# Patient Record
Sex: FEMALE
[De-identification: names, ages, dates, MRNs, and addresses within clinical notes are randomized per-mention and may not be internally consistent; named-entity substitution may affect disease eponyms.]

---

## 2021-04-26 ENCOUNTER — APPOINTMENT (OUTPATIENT)
Dept: INTERNAL MEDICINE | Facility: CLINIC | Age: 66
End: 2021-04-26

## 2021-04-26 PROBLEM — Z00.00 ENCOUNTER FOR PREVENTIVE HEALTH EXAMINATION: Status: ACTIVE | Noted: 2021-04-26

## 2022-10-18 ENCOUNTER — APPOINTMENT (OUTPATIENT)
Dept: OTOLARYNGOLOGY | Facility: CLINIC | Age: 67
End: 2022-10-18

## 2022-10-18 VITALS — BODY MASS INDEX: 22.08 KG/M2 | TEMPERATURE: 96.7 F | HEIGHT: 62 IN | WEIGHT: 120 LBS

## 2022-10-18 DIAGNOSIS — Z78.9 OTHER SPECIFIED HEALTH STATUS: ICD-10-CM

## 2022-10-18 DIAGNOSIS — H92.01 OTALGIA, RIGHT EAR: ICD-10-CM

## 2022-10-18 DIAGNOSIS — Z82.49 FAMILY HISTORY OF ISCHEMIC HEART DISEASE AND OTHER DISEASES OF THE CIRCULATORY SYSTEM: ICD-10-CM

## 2022-10-18 DIAGNOSIS — M26.609 UNSPECIFIED TEMPOROMANDIBULAR JOINT DISORDER: ICD-10-CM

## 2022-10-18 DIAGNOSIS — Z85.9 PERSONAL HISTORY OF MALIGNANT NEOPLASM, UNSPECIFIED: ICD-10-CM

## 2022-10-18 DIAGNOSIS — Z72.89 OTHER PROBLEMS RELATED TO LIFESTYLE: ICD-10-CM

## 2022-10-18 DIAGNOSIS — Z80.9 FAMILY HISTORY OF MALIGNANT NEOPLASM, UNSPECIFIED: ICD-10-CM

## 2022-10-18 PROCEDURE — 99203 OFFICE O/P NEW LOW 30 MIN: CPT

## 2022-10-18 RX ORDER — NIRMATRELVIR AND RITONAVIR 300-100 MG
20 X 150 MG & KIT ORAL
Qty: 30 | Refills: 0 | Status: ACTIVE | COMMUNITY
Start: 2022-04-30

## 2022-10-18 RX ORDER — METHYLPREDNISOLONE 4 MG/1
4 TABLET ORAL
Qty: 21 | Refills: 0 | Status: ACTIVE | COMMUNITY
Start: 2022-10-12

## 2022-10-18 RX ORDER — ANASTROZOLE TABLETS 1 MG/1
1 TABLET ORAL
Qty: 30 | Refills: 0 | Status: ACTIVE | COMMUNITY
Start: 2021-11-19

## 2022-10-18 RX ORDER — FLUTICASONE PROPIONATE 50 MCG
SPRAY, SUSPENSION NASAL
Refills: 0 | Status: ACTIVE | COMMUNITY

## 2022-10-18 RX ORDER — FEXOFENADINE HYDROCHLORIDE 180 MG/1
TABLET ORAL
Refills: 0 | Status: ACTIVE | COMMUNITY

## 2022-10-18 RX ORDER — BENZONATATE 200 MG/1
200 CAPSULE ORAL
Qty: 20 | Refills: 0 | Status: ACTIVE | COMMUNITY
Start: 2022-04-30

## 2022-10-18 NOTE — ASSESSMENT
[FreeTextEntry1] : She has a 4-week history of right ear pain.  Her ear was normal on exam.  Her symptoms are consistent with TMJ dysfunction.  She was tender on palpation.  She has no throat symptoms.\par \par Plan\par -Findings and management options were discussed with the patient.\par -Good ear hygiene\par -I recommended an audiogram but she was unable to stay for 1\par - Literature regarding TMJD was given to the patient\par - Management of TMJD discussed including use of a nightguard\par - she may consider physical therapy\par -She was given the names of TMJ specialists\par -I spoke with her about performing nasal endoscopy and flexible laryngoscopy to rule out lesions.  She deferred today.\par -She should follow-up if she has persistent symptoms or any concerns

## 2022-10-18 NOTE — HISTORY OF PRESENT ILLNESS
[de-identified] : MIGUEL MALONE is a 67 year old patient who has been seen here several years ago.  She is here for right ear pain for the past 4 weeks.  She takes Advil and has tried prednisone without improvement.  She has no tinnitus, dizziness, or otorrhea.  She has seen a dentist, and endodontist, an oral surgeon.  She may have TMJ dysfunction.  She does not have a nightguard.  She has mild nasal congestion and drainage.  She uses Mucinex and Flonase as needed.\par \par No history of recurrent middle ear infections or prior otologic surgery\par She does not smoke\par She has no throat pain

## 2022-10-18 NOTE — CONSULT LETTER
[Dear  ___] : Dear  [unfilled], [Consult Letter:] : I had the pleasure of evaluating your patient, [unfilled]. [Please see my note below.] : Please see my note below. [Consult Closing:] : Thank you very much for allowing me to participate in the care of this patient.  If you have any questions, please do not hesitate to contact me. [Sincerely,] : Sincerely, [FreeTextEntry3] : Ronit Quijano MD\par

## 2025-04-05 ENCOUNTER — OUTPATIENT (OUTPATIENT)
Dept: EMERGENCY DEPT | Facility: HOSPITAL | Age: 70
LOS: 1 days | End: 2025-04-05
Payer: COMMERCIAL

## 2025-04-05 VITALS
OXYGEN SATURATION: 97 % | HEART RATE: 77 BPM | SYSTOLIC BLOOD PRESSURE: 120 MMHG | RESPIRATION RATE: 16 BRPM | DIASTOLIC BLOOD PRESSURE: 61 MMHG

## 2025-04-05 VITALS
SYSTOLIC BLOOD PRESSURE: 130 MMHG | WEIGHT: 108.91 LBS | HEART RATE: 84 BPM | OXYGEN SATURATION: 100 % | DIASTOLIC BLOOD PRESSURE: 87 MMHG | RESPIRATION RATE: 18 BRPM

## 2025-04-05 LAB
ADD ON TEST-SPECIMEN IN LAB: SIGNIFICANT CHANGE UP
ALBUMIN SERPL ELPH-MCNC: 4.4 G/DL — SIGNIFICANT CHANGE UP (ref 3.3–5)
ALP SERPL-CCNC: 55 U/L — SIGNIFICANT CHANGE UP (ref 40–120)
ALT FLD-CCNC: 10 U/L — SIGNIFICANT CHANGE UP (ref 10–45)
ANION GAP SERPL CALC-SCNC: 15 MMOL/L — SIGNIFICANT CHANGE UP (ref 5–17)
APPEARANCE UR: ABNORMAL
APTT BLD: 32.3 SEC — SIGNIFICANT CHANGE UP (ref 24.5–35.6)
AST SERPL-CCNC: 17 U/L — SIGNIFICANT CHANGE UP (ref 10–40)
BASOPHILS # BLD AUTO: 0.04 K/UL — SIGNIFICANT CHANGE UP (ref 0–0.2)
BASOPHILS NFR BLD AUTO: 0.4 % — SIGNIFICANT CHANGE UP (ref 0–2)
BILIRUB DIRECT SERPL-MCNC: 0.2 MG/DL — SIGNIFICANT CHANGE UP (ref 0–0.3)
BILIRUB INDIRECT FLD-MCNC: 0.3 MG/DL — SIGNIFICANT CHANGE UP (ref 0.2–1)
BILIRUB SERPL-MCNC: 0.5 MG/DL — SIGNIFICANT CHANGE UP (ref 0.2–1.2)
BILIRUB UR-MCNC: NEGATIVE — SIGNIFICANT CHANGE UP
BLD GP AB SCN SERPL QL: NEGATIVE — SIGNIFICANT CHANGE UP
BUN SERPL-MCNC: 12 MG/DL — SIGNIFICANT CHANGE UP (ref 7–23)
CALCIUM SERPL-MCNC: 9.5 MG/DL — SIGNIFICANT CHANGE UP (ref 8.4–10.5)
CHLORIDE SERPL-SCNC: 102 MMOL/L — SIGNIFICANT CHANGE UP (ref 96–108)
CO2 SERPL-SCNC: 24 MMOL/L — SIGNIFICANT CHANGE UP (ref 22–31)
COLOR SPEC: YELLOW — SIGNIFICANT CHANGE UP
CREAT SERPL-MCNC: 0.66 MG/DL — SIGNIFICANT CHANGE UP (ref 0.5–1.3)
DIFF PNL FLD: NEGATIVE — SIGNIFICANT CHANGE UP
EGFR: 95 ML/MIN/1.73M2 — SIGNIFICANT CHANGE UP
EGFR: 95 ML/MIN/1.73M2 — SIGNIFICANT CHANGE UP
EOSINOPHIL # BLD AUTO: 0.08 K/UL — SIGNIFICANT CHANGE UP (ref 0–0.5)
EOSINOPHIL NFR BLD AUTO: 0.8 % — SIGNIFICANT CHANGE UP (ref 0–6)
GLUCOSE SERPL-MCNC: 123 MG/DL — HIGH (ref 70–99)
GLUCOSE UR QL: NEGATIVE MG/DL — SIGNIFICANT CHANGE UP
HCT VFR BLD CALC: 39.9 % — SIGNIFICANT CHANGE UP (ref 34.5–45)
HGB BLD-MCNC: 13.5 G/DL — SIGNIFICANT CHANGE UP (ref 11.5–15.5)
IMM GRANULOCYTES NFR BLD AUTO: 0.4 % — SIGNIFICANT CHANGE UP (ref 0–0.9)
INR BLD: 0.95 — SIGNIFICANT CHANGE UP (ref 0.85–1.16)
KETONES UR-MCNC: 15 MG/DL
LEUKOCYTE ESTERASE UR-ACNC: NEGATIVE — SIGNIFICANT CHANGE UP
LIDOCAIN IGE QN: 19 U/L — SIGNIFICANT CHANGE UP (ref 7–60)
LYMPHOCYTES # BLD AUTO: 1.13 K/UL — SIGNIFICANT CHANGE UP (ref 1–3.3)
LYMPHOCYTES # BLD AUTO: 10.8 % — LOW (ref 13–44)
MCHC RBC-ENTMCNC: 31.7 PG — SIGNIFICANT CHANGE UP (ref 27–34)
MCHC RBC-ENTMCNC: 33.8 G/DL — SIGNIFICANT CHANGE UP (ref 32–36)
MCV RBC AUTO: 93.7 FL — SIGNIFICANT CHANGE UP (ref 80–100)
MONOCYTES # BLD AUTO: 0.74 K/UL — SIGNIFICANT CHANGE UP (ref 0–0.9)
MONOCYTES NFR BLD AUTO: 7.1 % — SIGNIFICANT CHANGE UP (ref 2–14)
NEUTROPHILS # BLD AUTO: 8.43 K/UL — HIGH (ref 1.8–7.4)
NEUTROPHILS NFR BLD AUTO: 80.5 % — HIGH (ref 43–77)
NITRITE UR-MCNC: NEGATIVE — SIGNIFICANT CHANGE UP
NRBC BLD AUTO-RTO: 0 /100 WBCS — SIGNIFICANT CHANGE UP (ref 0–0)
PH UR: 8.5 (ref 5–8)
PLATELET # BLD AUTO: 408 K/UL — HIGH (ref 150–400)
POTASSIUM SERPL-MCNC: 3.5 MMOL/L — SIGNIFICANT CHANGE UP (ref 3.5–5.3)
POTASSIUM SERPL-SCNC: 3.5 MMOL/L — SIGNIFICANT CHANGE UP (ref 3.5–5.3)
PROT SERPL-MCNC: 6.6 G/DL — SIGNIFICANT CHANGE UP (ref 6–8.3)
PROT UR-MCNC: SIGNIFICANT CHANGE UP MG/DL
PROTHROM AB SERPL-ACNC: 10.9 SEC — SIGNIFICANT CHANGE UP (ref 9.9–13.4)
RBC # BLD: 4.26 M/UL — SIGNIFICANT CHANGE UP (ref 3.8–5.2)
RBC # FLD: 12.7 % — SIGNIFICANT CHANGE UP (ref 10.3–14.5)
RH IG SCN BLD-IMP: NEGATIVE — SIGNIFICANT CHANGE UP
RH IG SCN BLD-IMP: NEGATIVE — SIGNIFICANT CHANGE UP
SODIUM SERPL-SCNC: 141 MMOL/L — SIGNIFICANT CHANGE UP (ref 135–145)
SP GR SPEC: 1.01 — SIGNIFICANT CHANGE UP (ref 1–1.03)
UROBILINOGEN FLD QL: 0.2 MG/DL — SIGNIFICANT CHANGE UP (ref 0.2–1)
WBC # BLD: 10.46 K/UL — SIGNIFICANT CHANGE UP (ref 3.8–10.5)
WBC # FLD AUTO: 10.46 K/UL — SIGNIFICANT CHANGE UP (ref 3.8–10.5)

## 2025-04-05 PROCEDURE — 88304 TISSUE EXAM BY PATHOLOGIST: CPT

## 2025-04-05 PROCEDURE — 71046 X-RAY EXAM CHEST 2 VIEWS: CPT

## 2025-04-05 PROCEDURE — 71046 X-RAY EXAM CHEST 2 VIEWS: CPT | Mod: 26

## 2025-04-05 PROCEDURE — 88360 TUMOR IMMUNOHISTOCHEM/MANUAL: CPT | Mod: 26

## 2025-04-05 PROCEDURE — 80076 HEPATIC FUNCTION PANEL: CPT

## 2025-04-05 PROCEDURE — 86901 BLOOD TYPING SEROLOGIC RH(D): CPT

## 2025-04-05 PROCEDURE — 99285 EMERGENCY DEPT VISIT HI MDM: CPT

## 2025-04-05 PROCEDURE — 93010 ELECTROCARDIOGRAM REPORT: CPT

## 2025-04-05 PROCEDURE — 88360 TUMOR IMMUNOHISTOCHEM/MANUAL: CPT

## 2025-04-05 PROCEDURE — 88342 IMHCHEM/IMCYTCHM 1ST ANTB: CPT | Mod: 26

## 2025-04-05 PROCEDURE — 36415 COLL VENOUS BLD VENIPUNCTURE: CPT

## 2025-04-05 PROCEDURE — 44970 LAPAROSCOPY APPENDECTOMY: CPT

## 2025-04-05 PROCEDURE — 86850 RBC ANTIBODY SCREEN: CPT

## 2025-04-05 PROCEDURE — 86900 BLOOD TYPING SEROLOGIC ABO: CPT

## 2025-04-05 PROCEDURE — 85025 COMPLETE CBC W/AUTO DIFF WBC: CPT

## 2025-04-05 PROCEDURE — 88304 TISSUE EXAM BY PATHOLOGIST: CPT | Mod: 26

## 2025-04-05 PROCEDURE — C9399: CPT

## 2025-04-05 PROCEDURE — 88342 IMHCHEM/IMCYTCHM 1ST ANTB: CPT

## 2025-04-05 PROCEDURE — 85730 THROMBOPLASTIN TIME PARTIAL: CPT

## 2025-04-05 PROCEDURE — 83690 ASSAY OF LIPASE: CPT

## 2025-04-05 PROCEDURE — 93005 ELECTROCARDIOGRAM TRACING: CPT

## 2025-04-05 PROCEDURE — 74177 CT ABD & PELVIS W/CONTRAST: CPT | Mod: 26

## 2025-04-05 PROCEDURE — 80048 BASIC METABOLIC PNL TOTAL CA: CPT

## 2025-04-05 PROCEDURE — C1889: CPT

## 2025-04-05 PROCEDURE — 74177 CT ABD & PELVIS W/CONTRAST: CPT | Mod: MC

## 2025-04-05 PROCEDURE — 85610 PROTHROMBIN TIME: CPT

## 2025-04-05 DEVICE — CLIP APPLIER ETHICON LIGAMAX 5MM: Type: IMPLANTABLE DEVICE | Status: FUNCTIONAL

## 2025-04-05 DEVICE — STAPLER COVIDIEN TRI-STAPLE 45MM PURPLE RELOAD: Type: IMPLANTABLE DEVICE | Status: FUNCTIONAL

## 2025-04-05 DEVICE — STAPLER COVIDIEN TRI-STAPLE 45MM TAN RELOAD: Type: IMPLANTABLE DEVICE | Status: FUNCTIONAL

## 2025-04-05 RX ORDER — METOCLOPRAMIDE HCL 10 MG
10 TABLET ORAL ONCE
Refills: 0 | Status: COMPLETED | OUTPATIENT
Start: 2025-04-05 | End: 2025-04-05

## 2025-04-05 RX ORDER — HEPARIN SODIUM 1000 [USP'U]/ML
5000 INJECTION INTRAVENOUS; SUBCUTANEOUS EVERY 8 HOURS
Refills: 0 | Status: DISCONTINUED | OUTPATIENT
Start: 2025-04-05 | End: 2025-04-05

## 2025-04-05 RX ORDER — ONDANSETRON HCL/PF 4 MG/2 ML
4 VIAL (ML) INJECTION ONCE
Refills: 0 | Status: COMPLETED | OUTPATIENT
Start: 2025-04-05 | End: 2025-04-05

## 2025-04-05 RX ORDER — OXYCODONE HYDROCHLORIDE 30 MG/1
5 TABLET ORAL EVERY 6 HOURS
Refills: 0 | Status: DISCONTINUED | OUTPATIENT
Start: 2025-04-05 | End: 2025-04-05

## 2025-04-05 RX ORDER — ONDANSETRON HCL/PF 4 MG/2 ML
4 VIAL (ML) INJECTION EVERY 6 HOURS
Refills: 0 | Status: DISCONTINUED | OUTPATIENT
Start: 2025-04-05 | End: 2025-04-05

## 2025-04-05 RX ORDER — CEFTRIAXONE 500 MG/1
1000 INJECTION, POWDER, FOR SOLUTION INTRAMUSCULAR; INTRAVENOUS ONCE
Refills: 0 | Status: COMPLETED | OUTPATIENT
Start: 2025-04-05 | End: 2025-04-05

## 2025-04-05 RX ORDER — ACETAMINOPHEN 500 MG/5ML
750 LIQUID (ML) ORAL ONCE
Refills: 0 | Status: COMPLETED | OUTPATIENT
Start: 2025-04-05 | End: 2025-04-05

## 2025-04-05 RX ORDER — CEFPODOXIME PROXETIL 200 MG/1
2 TABLET, FILM COATED ORAL
Qty: 12 | Refills: 0
Start: 2025-04-05 | End: 2025-04-07

## 2025-04-05 RX ORDER — METRONIDAZOLE 250 MG
500 TABLET ORAL EVERY 8 HOURS
Refills: 0 | Status: DISCONTINUED | OUTPATIENT
Start: 2025-04-05 | End: 2025-04-05

## 2025-04-05 RX ORDER — OXYCODONE HYDROCHLORIDE 30 MG/1
1 TABLET ORAL
Qty: 12 | Refills: 0
Start: 2025-04-05 | End: 2025-04-07

## 2025-04-05 RX ORDER — CEFTRIAXONE 500 MG/1
INJECTION, POWDER, FOR SOLUTION INTRAMUSCULAR; INTRAVENOUS
Refills: 0 | Status: DISCONTINUED | OUTPATIENT
Start: 2025-04-05 | End: 2025-04-05

## 2025-04-05 RX ORDER — ASPIRIN 325 MG
1 TABLET ORAL
Qty: 30 | Refills: 0
Start: 2025-04-05 | End: 2025-05-04

## 2025-04-05 RX ORDER — HYDROMORPHONE/SOD CHLOR,ISO/PF 2 MG/10 ML
0.5 SYRINGE (ML) INJECTION
Refills: 0 | Status: DISCONTINUED | OUTPATIENT
Start: 2025-04-05 | End: 2025-04-05

## 2025-04-05 RX ORDER — IOHEXOL 350 MG/ML
30 INJECTION, SOLUTION INTRAVENOUS ONCE
Refills: 0 | Status: COMPLETED | OUTPATIENT
Start: 2025-04-05 | End: 2025-04-05

## 2025-04-05 RX ORDER — METRONIDAZOLE 250 MG
500 TABLET ORAL ONCE
Refills: 0 | Status: DISCONTINUED | OUTPATIENT
Start: 2025-04-05 | End: 2025-04-05

## 2025-04-05 RX ORDER — ATORVASTATIN CALCIUM 80 MG/1
1 TABLET, FILM COATED ORAL
Qty: 30 | Refills: 0
Start: 2025-04-05 | End: 2025-05-04

## 2025-04-05 RX ORDER — ACETAMINOPHEN 500 MG/5ML
1000 LIQUID (ML) ORAL EVERY 6 HOURS
Refills: 0 | Status: DISCONTINUED | OUTPATIENT
Start: 2025-04-05 | End: 2025-04-05

## 2025-04-05 RX ORDER — ANASTROZOLE 1 MG/1
1 TABLET ORAL
Refills: 0 | DISCHARGE

## 2025-04-05 RX ORDER — SODIUM CHLORIDE 9 G/1000ML
1000 INJECTION, SOLUTION INTRAVENOUS
Refills: 0 | Status: DISCONTINUED | OUTPATIENT
Start: 2025-04-05 | End: 2025-04-05

## 2025-04-05 RX ORDER — BUPIVACAINE 13.3 MG/ML
20 INJECTION, SUSPENSION, LIPOSOMAL INFILTRATION ONCE
Refills: 0 | Status: DISCONTINUED | OUTPATIENT
Start: 2025-04-05 | End: 2025-04-05

## 2025-04-05 RX ORDER — METRONIDAZOLE 250 MG
1 TABLET ORAL
Qty: 9 | Refills: 0
Start: 2025-04-05 | End: 2025-04-07

## 2025-04-05 RX ADMIN — Medication 750 MILLIGRAM(S): at 07:54

## 2025-04-05 RX ADMIN — CEFTRIAXONE 100 MILLIGRAM(S): 500 INJECTION, POWDER, FOR SOLUTION INTRAMUSCULAR; INTRAVENOUS at 09:50

## 2025-04-05 RX ADMIN — Medication 104 MILLIGRAM(S): at 07:49

## 2025-04-05 RX ADMIN — Medication 750 MILLIGRAM(S): at 07:55

## 2025-04-05 RX ADMIN — IOHEXOL 30 MILLILITER(S): 350 INJECTION, SOLUTION INTRAVENOUS at 07:39

## 2025-04-05 RX ADMIN — Medication 10 MILLIGRAM(S): at 08:19

## 2025-04-05 RX ADMIN — Medication 300 MILLIGRAM(S): at 07:39

## 2025-04-05 RX ADMIN — Medication 4 MILLIGRAM(S): at 07:39

## 2025-04-05 RX ADMIN — CEFTRIAXONE 100 MILLIGRAM(S): 500 INJECTION, POWDER, FOR SOLUTION INTRAMUSCULAR; INTRAVENOUS at 10:55

## 2025-04-05 NOTE — ED PROVIDER NOTE - CLINICAL SUMMARY MEDICAL DECISION MAKING FREE TEXT BOX
pt c/o lower abd pain x mon, worse since last night, + nausea, well appearing, non toxic, afebrile, + rlq tend, labs wnl, ct showed acute appy - spoke to dr jacobo and pt accepted, pre op labs added, abx given

## 2025-04-05 NOTE — DISCHARGE NOTE PROVIDER - NSDCFUADDAPPT_GEN_ALL_CORE_FT
Patient can follow-up with Dr. Bloom outpatient. She should call the office on Monday to confirm appointment time.     [address], [facility], 13th Floor, [place] [address] | [phone] | Fax: [phone]

## 2025-04-05 NOTE — H&P ADULT - ATTENDING COMMENTS
Patient with acute appendicitis. Will proceed to the OR for appendectomy.  SMA stenosis noted on CT. Appears to be largely asymptomatic.  Will obtain vascular surgery opinion.

## 2025-04-05 NOTE — BRIEF OPERATIVE NOTE - OPERATION/FINDINGS
Procedure: Laparoscopic appendectomy   Indication:  Procedure: Laparoscopic appendectomy   Indication: Acute uncomplicated appendicitis    Eulalia cutdown supraumbilically. Abdomen insufflated and inspected. Additional 5mm trocars x2 placed in LLQ and suprapubic region. Noted floppy cecum in the pelvis, with completely retrocecal appendix. Appendix noted to be acutely inflamed, however not perforated or gangrenous. Some murky fluid surrounding appendix. Mesoappendix ligated with ligasure. Using 45mm EndoGIA purple load x2, appendix was ligated with lip of cecal tissue. Care was undertaken to not narrow IC valve. Hemostasis achieved. Specimen removed and umbilical fascia closed with 0-0 Maxon sutures. Skin closed with Monocryl.

## 2025-04-05 NOTE — DISCHARGE NOTE PROVIDER - CARE PROVIDERS DIRECT ADDRESSES
[email or web address]

## 2025-04-05 NOTE — DISCHARGE NOTE PROVIDER - PROVIDER TOKENS
PROVIDER:[TOKEN:[[id number]],FOLLOWUP:[1 week]] PROVIDER:[TOKEN:[[id number]],FOLLOWUP:[1 week]],PROVIDER:[TOKEN:[[id number]],FOLLOWUP:[1 week]]

## 2025-04-05 NOTE — CONSULT NOTE ADULT - SUBJECTIVE AND OBJECTIVE BOX
Vascular Attending:  Dr. Bloom      HPI:  68yo Female pt with PMH of breast cancer, s/p lumpectomy and on Anastazole; osteoprosis; hx of C diff x2 in  and , presents to the ED due to a few weeks to months hx of abdominal pain admitted to [facility] general surgery for acute appendicitis. Per patient she has had worsening post-prandial abdominal pain since January of this year. Patient denies weight loss. Over the last month shes had localized RLQ pain that has gotten worse with severe pain last night that brought her into the ED today. She was evaluated bedside by general surgery and found to have acute appendicitis on CT scan. Incidentally she was noted to have severe stenosis of her SMA. Vascular was consulted for evaluation for mesenteric ischemia. Patient is to go to OR today for class 2 lap appy.         PAST MEDICAL & SURGICAL HISTORY:      REVIEW OF SYSTEMS: as per HPI    MEDICATIONS  (STANDING):  acetaminophen     Tablet .. 1000 milliGRAM(s) Oral every 6 hours  BUpivacaine liposome 1.3% Injectable (no eMAR) 20 milliLiter(s) Local Injection once  heparin   Injectable 5000 Unit(s) SubCutaneous every 8 hours  lactated ringers. 1000 milliLiter(s) (75 mL/Hr) IV Continuous <Continuous>  metroNIDAZOLE  IVPB 500 milliGRAM(s) IV Intermittent every 8 hours    MEDICATIONS  (PRN):  HYDROmorphone  Injectable 0.5 milliGRAM(s) IV Push every 3 hours PRN Severe Pain (7 - 10)  ondansetron Injectable 4 milliGRAM(s) IV Push every 6 hours PRN Nausea  oxyCODONE    IR 5 milliGRAM(s) Oral every 6 hours PRN Moderate Pain (4 - 6)      Allergies    Augmentin (Unknown)  vancomycin (Hives)  erythromycin (Hives)  azithromycin (Hives)    Intolerances        SOCIAL HISTORY: N/A    FAMILY HISTORY: N/A      Vital Signs Last 24 Hrs  T(C): 36.3 (2025 14:05), Max: 37.3 (2025 10:26)  T(F): 97.3 (2025 14:05), Max: 99.1 (2025 10:26)  HR: 65 (2025 14:35) (65 - 90)  BP: 118/59 (2025 14:35) (110/66 - 130/87)  BP(mean): 82 (2025 14:35) (82 - 89)  RR: 13 (2025 14:35) (13 - 24)  SpO2: 100% (2025 14:35) (99% - 100%)    Parameters below as of 2025 14:05  Patient On (Oxygen Delivery Method): nasal cannula  O2 Flow (L/min): 2      PHYSICAL EXAM:  General: AAOx3, NAD, malnourished/thin body habitus  Cardio: S1,S2, normotensive; normal HR   Pulm: Nonlabored breathing  Abdomen: soft; non-distended; tender at the RLQ; no peritoneal signs   Extremities: WWP, no edema  Vascular: Palp pulses.         LABS:                        13.5   10.46 )-----------( 408      ( 2025 06:45 )             39.9     04-    141  |  102  |  12  ----------------------------<  123[H]  3.5   |  24  |  0.66    Ca    9.5      2025 06:45    TPro  6.6  /  Alb  4.4  /  TBili  0.5  /  DBili  0.2  /  AST  17  /  ALT  10  /  AlkPhos  55  04-05    PT/INR - ( 2025 06:45 )   PT: 10.9 sec;   INR: 0.95          PTT - ( 2025 06:45 )  PTT:32.3 sec  Urinalysis Basic - ( 2025 06:47 )    Color: Yellow / Appearance: Turbid / S.014 / pH: x  Gluc: x / Ketone: 15 mg/dL  / Bili: Negative / Urobili: 0.2 mg/dL   Blood: x / Protein: Trace mg/dL / Nitrite: Negative   Leuk Esterase: Negative / RBC: x / WBC x   Sq Epi: x / Non Sq Epi: x / Bacteria: x        RADIOLOGY & ADDITIONAL STUDIES:    IMAGING   PROCEDURE:  CT of the Abdomen and Pelvis was performed.  Sagittal and coronal reformats were performed.    FINDINGS:  LOWER CHEST: Within normal limits.    LIVER: Within normal limits.  BILE DUCTS: Normal caliber.  GALLBLADDER: Within normal limits.  SPLEEN: Within normal limits.  PANCREAS: Within normal limits.  ADRENALS: Within normal limits.  KIDNEYS/URETERS: Within normal limits.    BLADDER: Within normal limits.  REPRODUCTIVE ORGANS: Uterus and adnexa within normal limits.    BOWEL: No bowel obstruction. Oral contrast has reached the transverse colon. Appendix is distended up to 9 mm and thick-walled. Appendix does not fill with oral contrast, despite contrast filled cecum.  PERITONEUM/RETROPERITONEUM: Within normal limits.  VESSELS: Atherosclerotic change. Severe stenosis of the proximal SMA.  LYMPH NODES: No lymphadenopathy.  ABDOMINAL WALL: Within normal limits.  BONES: Within normal limits.    IMPRESSION:  Acute appendicitis.

## 2025-04-05 NOTE — PROGRESS NOTE ADULT - ASSESSMENT
69y with no significant co-morbidities presents due to few weeks hx of vague abdominal pain and acute worsening of abdominal pain that started yesterday evening. CT findings of acute appendicits and incidental findings of severe SMA stenosis (c/o postprandial pain since Jan 2025) and labs relevant for WBC of 10.46 at the upper end of normal. Admitting patient for operative management of acute appendicitis. Now s/p lap appy, partial cecectomy (4/5).     Admit patient to regional under Dr. Palafox    Reg diet/IVF  Pain/nausea control prn  cefpodoxime/flagyl   Vascular recs prior to dc, will need angio w/ SMA stent outpatient 69y with no significant co-morbidities presents due to few weeks hx of vague abdominal pain and acute worsening of abdominal pain that started yesterday evening. CT findings of acute appendicits and incidental findings of severe SMA stenosis (c/o postprandial pain since Jan 2025) and labs relevant for WBC of 10.46 at the upper end of normal. Admitting patient for operative management of acute appendicitis. Now s/p lap appy, partial cecectomy (4/5).     Reg diet/IVF  Pain/nausea control prn  cefpodoxime/flagyl   Vascular recs prior to dc, will need angio w/ SMA stent outpatient

## 2025-04-05 NOTE — DISCHARGE NOTE NURSING/CASE MANAGEMENT/SOCIAL WORK - FINANCIAL ASSISTANCE
[facility] provides services at a reduced cost to those who are determined to be eligible through [facility]’s financial assistance program. Information regarding [facility]’s financial assistance program can be found by going to [email or web address] or by calling [phone].

## 2025-04-05 NOTE — H&P ADULT - ASSESSMENT
69y with no significant co-morbidities presents due to few weeks hx of vague abdominal pain and acute worsening of abdominal pain that started yesterday evening. CT findings of acute appendicits and incidental findings of severe SMA stenosis and labs relevant for WBC of 10.46 at the upper end of normal. Admitting patient for operative management of acute appendicitis.      Admit patient to regional under Dr. Palafox    NPO   IVF at 75cc/hr   SCDs/IS/OOB  SQH  Pain management with PO Tylenol and Dilaudid PRN  Antibiotics CFTX 2g q24 and flagyll 500 q8   Consult Vascular

## 2025-04-05 NOTE — DISCHARGE NOTE PROVIDER - NSDCFUADDINST_GEN_ALL_CORE_FT
Please follow up with Dr. Palafox in 1 week. Please call to schedule your appointment.   Please follow up with Dr. Bloom this week, your Vascular Surgeon regarding the CT finding of severe SMA stenosis.     You may resume a regular diet.   You may resume your home medications as prescribed.   Please take Tylenol 1000mg every 6 hours for pain. You may alternate every 3 hours with ibuprofen as needed.   Please take oxycodone 5mg every 6 hours as needed for pain. Take colace 100mg daily while taking oxycodone to prevent constipation.  Please take cefpodoxime and flagyl as prescribed for x3 days.    No heavy lifting or weight bearing for 6 weeks or until cleared by surgeon.  You may shower 24 hours after surgery. Pat dry incisions, do not scrub. Do not submerge in water until incisions have healed.     General Discharge Instructions:  Please resume all regular home medications unless specifically advised not to take a particular medication. Also, please take any new medications as prescribed.  Please get plenty of rest, continue to ambulate several times per day, and drink adequate amounts of fluids. Avoid lifting weights greater than 5-10 lbs until you follow-up with your surgeon, who will instruct you further regarding activity restrictions.  Avoid driving or operating heavy machinery while taking pain medications.  Please follow-up with your surgeon and Primary Care Provider (PCP) as advised.  Incision Care:  *Please call your doctor or nurse practitioner if you have increased pain, swelling, redness, or drainage from the incision site.  *Avoid swimming and baths until your follow-up appointment.  *You may shower, and wash surgical incisions with a mild soap and warm water. Gently pat the area dry.  *If you have staples, they will be removed at your follow-up appointment.  *If you have steri-strips, they will fall off on their own. Please remove any remaining strips 7-10 days after surgery.    Warning Signs:  Please call your doctor or nurse practitioner if you experience the following:  *You experience new chest pain, pressure, squeezing or tightness.  *New or worsening cough, shortness of breath, or wheeze.  *If you are vomiting and cannot keep down fluids or your medications.  *You are getting dehydrated due to continued vomiting, diarrhea, or other reasons. Signs of dehydration include dry mouth, rapid heartbeat, or feeling dizzy or faint when standing.  *You see blood or dark/black material when you vomit or have a bowel movement.  *You experience burning when you urinate, have blood in your urine, or experience a discharge.  *Your pain is not improving within 8-12 hours or is not gone within 24 hours. Call or return immediately if your pain is getting worse, changes location, or moves to your chest or back.  *You have shaking chills, or fever greater than 101.5 degrees Fahrenheit or 38 degrees Celsius.  *Any change in your symptoms, or any new symptoms that concern you.   Please follow up with Dr. Palafox in 1 week. Please call to schedule your appointment.   Please follow up with Dr. Bloom this week, your Vascular Surgeon regarding the CT finding of severe SMA stenosis.     You may resume a regular diet.   You may resume your home medications as prescribed.   Please take Tylenol 1000mg every 6 hours for pain. You may alternate every 3 hours with ibuprofen as needed.   Please take oxycodone 5mg every 6 hours as needed for pain. Take colace 100mg daily while taking oxycodone to prevent constipation.  Please take cefpodoxime and flagyl as prescribed for x3 days.  Please take aspirin and statin as prescribed.    No heavy lifting or weight bearing for 6 weeks or until cleared by surgeon.  You may shower 24 hours after surgery. Pat dry incisions, do not scrub. Do not submerge in water until incisions have healed.     General Discharge Instructions:  Please resume all regular home medications unless specifically advised not to take a particular medication. Also, please take any new medications as prescribed.  Please get plenty of rest, continue to ambulate several times per day, and drink adequate amounts of fluids. Avoid lifting weights greater than 5-10 lbs until you follow-up with your surgeon, who will instruct you further regarding activity restrictions.  Avoid driving or operating heavy machinery while taking pain medications.  Please follow-up with your surgeon and Primary Care Provider (PCP) as advised.  Incision Care:  *Please call your doctor or nurse practitioner if you have increased pain, swelling, redness, or drainage from the incision site.  *Avoid swimming and baths until your follow-up appointment.  *You may shower, and wash surgical incisions with a mild soap and warm water. Gently pat the area dry.  *If you have staples, they will be removed at your follow-up appointment.  *If you have steri-strips, they will fall off on their own. Please remove any remaining strips 7-10 days after surgery.    Warning Signs:  Please call your doctor or nurse practitioner if you experience the following:  *You experience new chest pain, pressure, squeezing or tightness.  *New or worsening cough, shortness of breath, or wheeze.  *If you are vomiting and cannot keep down fluids or your medications.  *You are getting dehydrated due to continued vomiting, diarrhea, or other reasons. Signs of dehydration include dry mouth, rapid heartbeat, or feeling dizzy or faint when standing.  *You see blood or dark/black material when you vomit or have a bowel movement.  *You experience burning when you urinate, have blood in your urine, or experience a discharge.  *Your pain is not improving within 8-12 hours or is not gone within 24 hours. Call or return immediately if your pain is getting worse, changes location, or moves to your chest or back.  *You have shaking chills, or fever greater than 101.5 degrees Fahrenheit or 38 degrees Celsius.  *Any change in your symptoms, or any new symptoms that concern you.

## 2025-04-05 NOTE — H&P ADULT - HISTORY OF PRESENT ILLNESS
70yo Female pt with PMH of breast cancer, s/p lumpectomy and on Anastazole; osteoprosis; hx of C diff x2 in 2019 and 2016/17, presents to the ED due to a few weeks to months hx of abdominal pain, pt tried to see GI for this but wasn't able to see anyone till mid April. Last night acute worsening of abdominal pain, no nausea or vomiting, no fevers or chills. 1 episode of diarrhea yesterday. Band like lower abdominal pain, anorexia, last meal 3pm yesterday       PMH: breast cancer, s/p lumpectomy and on Anastazole; hx of C diff x2 in 2019 and 2016/17  PSHx: breat cancer and lumpectomy   Medications:  Anastazole 1mg qd  Allergies: Augment, Azithromycin, Erythromycin, Vancomycin, Prolia   Social Hx: former smoker (quit >10 years ago): social ETOH   Family Hx: Denies family hx of IBS, Crohn's, UC, or colon cancer.  Last colonoscopy: 2 years ago at [facility] and normal   Last EGD: 10 years ago and normal     acetaminophen     Tablet .. 1000 milliGRAM(s) Oral every 6 hours  heparin   Injectable 5000 Unit(s) SubCutaneous every 8 hours  HYDROmorphone  Injectable 0.5 milliGRAM(s) IV Push every 3 hours PRN  lactated ringers. 1000 milliLiter(s) IV Continuous <Continuous>  metroNIDAZOLE  IVPB 500 milliGRAM(s) IV Intermittent once  ondansetron Injectable 4 milliGRAM(s) IV Push every 6 hours PRN      T(C): 37.3 (04-05-25 @ 10:26), Max: 37.3 (04-05-25 @ 10:26)  HR: 76 (04-05-25 @ 10:26) (76 - 84)  BP: 110/66 (04-05-25 @ 10:26) (110/66 - 130/87)  RR: 18 (04-05-25 @ 10:26) (18 - 18)  SpO2: 99% (04-05-25 @ 10:26) (99% - 100%)        General: AAOx3, NAD, laying comfortably in bed  Cardio: S1,S2, normotensive; normal HR   Pulm: Nonlabored breathing  Abdomen: soft; non-distended; tender at the RLQ; no peritoneal signs   Extremities: WWP, peripheral pulses appreciated        LABS:                        13.5   10.46 )-----------( 408      ( 05 Apr 2025 06:45 )             39.9     04-05    141  |  102  |  12  ----------------------------<  123[H]  3.5   |  24  |  0.66    Ca    9.5      05 Apr 2025 06:45    TPro  6.6  /  Alb  4.4  /  TBili  0.5  /  DBili  0.2  /  AST  17  /  ALT  10  /  AlkPhos  55  04-05    PT/INR - ( 05 Apr 2025 06:45 )   PT: 10.9 sec;   INR: 0.95          PTT - ( 05 Apr 2025 06:45 )  PTT:32.3 sec      IMAGING   PROCEDURE:  CT of the Abdomen and Pelvis was performed.  Sagittal and coronal reformats were performed.    FINDINGS:  LOWER CHEST: Within normal limits.    LIVER: Within normal limits.  BILE DUCTS: Normal caliber.  GALLBLADDER: Within normal limits.  SPLEEN: Within normal limits.  PANCREAS: Within normal limits.  ADRENALS: Within normal limits.  KIDNEYS/URETERS: Within normal limits.    BLADDER: Within normal limits.  REPRODUCTIVE ORGANS: Uterus and adnexa within normal limits.    BOWEL: No bowel obstruction. Oral contrast has reached the transverse colon. Appendix is distended up to 9 mm and thick-walled. Appendix does not fill with oral contrast, despite contrast filled cecum.  PERITONEUM/RETROPERITONEUM: Within normal limits.  VESSELS: Atherosclerotic change. Severe stenosis of the proximal SMA.  LYMPH NODES: No lymphadenopathy.  ABDOMINAL WALL: Within normal limits.  BONES: Within normal limits.    IMPRESSION:  Acute appendicitis.    --- End of Report ---    ALIE MEMBRENO DO; Attending Radiologist  This document has been electronically signed. Apr 5 2025 9:27AM

## 2025-04-05 NOTE — ED ADULT NURSE NOTE - NSFALLRISKASMTTYPE_ED_ALL_ED
[address] [facility] Cardiology  [address] [facility] [address]  Phone: [phone]  Fax: [address], DO    October 12, 2022     Julietta Fothergill, [address] [place] [address]    Patient: Mary Alice Tejada   MR Number: [id number]   YOB: 1982   Date of Visit: 10/10/2022       Dear Julietta Fothergill: Thank you for referring Andrey Castaneda to me for evaluation/treatment. Below are the relevant portions of my assessment and plan of care. If you have questions, please do not hesitate to call me. I look forward to following Viviane Enamorado along with you.     Sincerely,      Pollo Serrano, DO Initial (On Arrival)

## 2025-04-05 NOTE — CONSULT NOTE ADULT - ASSESSMENT
68yo Female pt with PMH of breast cancer, s/p lumpectomy and on Anastazole; osteoprosis; hx of C diff x2 in 2019 and 2016/17, presents to the ED due to a few weeks to months hx of abdominal pain admitted to [facility] general surgery for acute appendicitis. Per patient she has had worsening post-prandial abdominal pain since January of this year. Patient denies weight loss. Over the last month shes had localized RLQ pain that has gotten worse with severe pain last night that brought her into the ED today. She was evaluated bedside by general surgery and found to have acute appendicitis on CT scan. Incidentally she was noted to have severe stenosis of her SMA. Vascular was consulted for evaluation for mesenteric ischemia.      Patient's clinical picture of post-prandial pain and thin body habitus combined with severe proximal SMA stenosis most likely represents a picture of chronic mesenteric ischemia    Plan:   -Patient can be discharged per general surgery   -Patient should be on an antiplatelet agent+statin outpatient  - Patient can follow-up with Dr. Bloom outpatient. She should call the office to confirm appointment time.     [address], [address], [place] [address] | [phone] | Fax: [phone]

## 2025-04-05 NOTE — ED ADULT NURSE NOTE - OBJECTIVE STATEMENT
Received ambulatory with steady gait with chief complaints of lower abdominal pain since January. Pt states "I've been trying to manage this abdominal pain since January but last night I couldn't. I took a gas medication but it didn't work. I have history of cdiff, my last one was before 2020. I'm afraid that my cancer spread." +nausea, vomiting noted. +diarrhea x 2 today.    Patient AOX4, speaking full sentences. Resps even and nonlabored. Moves all extremities. No obvious trauma/injury/deformity noted. Patient oriented to ED area. All needs attended. POC reviewed. Gown in place. Pt was instructed to ask for help to go to the bathroom, patient verbalized understanding. Purposeful proactive hourly rounding in progress.

## 2025-04-05 NOTE — DISCHARGE NOTE PROVIDER - HOSPITAL COURSE
69y with no significant co-morbidities presents due to few weeks hx of vague abdominal pain and acute worsening of abdominal pain that started yesterday evening. CT findings of acute appendicits and incidental findings of severe SMA stenosis (c/o postprandial pain since Jan 2025) and labs relevant for WBC of 10.46 at the upper end of normal. Admitting patient for operative management of acute appendicitis. Now s/p laparoscopic appendectomy, partial cecectomy (4/5). Patient tolerated procedure well. Patient tolerated diet, pain well controlled and was seen by Vascular surgery for recommendations after being found to have severe SMA stenosis on CT. Patient was discharged with x3 day course of abx and vascular surgery outpatient follow up for this week.

## 2025-04-05 NOTE — DISCHARGE NOTE NURSING/CASE MANAGEMENT/SOCIAL WORK - PATIENT PORTAL LINK FT
You can access the FollowMyHealth Patient Portal offered by [facility] by registering at the following website: [email or web address]. By joining [employer]’s FollowMyHealth portal, you will also be able to view your health information using other applications (apps) compatible with our system.

## 2025-04-05 NOTE — PRE-ANESTHESIA EVALUATION ADULT - WEIGHT IN LBS
Duration Of Freeze Thaw-Cycle (Seconds): 10 Post-Care Instructions: I reviewed with the patient in detail post-care instructions. Patient is to wear sunprotection, and avoid picking at any of the treated lesions. Pt may apply Vaseline to crusted or scabbing areas. Render Post-Care Instructions In Note?: no Detail Level: Detailed Show Aperture Variable?: Yes Aperture Size (Optional): C Consent: The patient's consent was obtained including but not limited to risks of crusting, scabbing, blistering, scarring, darker or lighter pigmentary change, recurrence, incomplete removal and infection. Number Of Freeze-Thaw Cycles: 1 freeze-thaw cycle Medical Necessity Clause: This procedure was medically necessary because the lesions that were treated were: Medical Necessity Information: It is in your best interest to select a reason for this procedure from the list below. All of these items fulfill various CMS LCD requirements except the new and changing color options. Size Of Lesion In Cm (Optional): 0 108.9

## 2025-04-05 NOTE — PROGRESS NOTE ADULT - SUBJECTIVE AND OBJECTIVE BOX
Procedure: lap appy  Surgeon: Dr. Palafox    S: Pt has no complaints. Denies CP, SOB, MCNEILL, calf tenderness. Pain controlled with medication.    O:  T(C): 36.3 (04-05-25 @ 14:05), Max: 36.3 (04-05-25 @ 14:05)  T(F): 97.3 (04-05-25 @ 14:05), Max: 97.3 (04-05-25 @ 14:05)  HR: 75 (04-05-25 @ 16:05) (65 - 90)  BP: 118/59 (04-05-25 @ 16:05) (105/62 - 128/60)  RR: 16 (04-05-25 @ 16:05) (13 - 24)  SpO2: 97% (04-05-25 @ 16:05) (97% - 100%)  Wt(kg): --                        13.5   10.46 )-----------( 408      ( 05 Apr 2025 06:45 )             39.9     04-05    141  |  102  |  12  ----------------------------<  123[H]  3.5   |  24  |  0.66    Ca    9.5      05 Apr 2025 06:45    TPro  6.6  /  Alb  4.4  /  TBili  0.5  /  DBili  0.2  /  AST  17  /  ALT  10  /  AlkPhos  55  04-05      Gen: NAD, resting comfortably in bed  C/V: NSR  Pulm: Nonlabored breathing, no respiratory distress  Abd: soft, NT/ND Incisions CDI  Extrem: WWP, no calf edema, SCDs in place

## 2025-04-05 NOTE — ED PROVIDER NOTE - ATTENDING APP SHARED VISIT CONTRIBUTION OF CARE
The pt is a 68 y/o F, who presents to ED c/o lower abd pain x mon, worse over past few d. Has not seen pmd or gi for this. Pain to lower abd, constant, dull, non radiating, + nausea, has not taken any meds for symptoms. Denies fevers, chills, cp, sob, emesis, diarrhea, anorexia, dysuria.    pt c/o lower abd pain x mon, worse since last night, + nausea, well appearing, non toxic, afebrile, + rlq tend, labs wnl, ct showed acute appy - spoke to dr jacobo and pt accepted, pre op labs added, abx given    Addendum to attending statement: I have reviewed the ACP note and agree with the history, exam, and plan of care. I  was available to PA   as a supervising provider if needed. PA given opportunity to ask questions and request further evaluation / care.    Pt with abd pain  Labs, CT done in ED  simple appendicitis  Preop in ED and admitted to surgery for mgmt

## 2025-04-05 NOTE — DISCHARGE NOTE NURSING/CASE MANAGEMENT/SOCIAL WORK - NSDCPEFALRISK_GEN_ALL_CORE
For information on Fall & Injury Prevention, visit: [email or web address] OR  [email or web address] OR  https://www.cdc.gov/steadi/patient.html

## 2025-04-05 NOTE — DISCHARGE NOTE PROVIDER - CARE PROVIDER_API CALL
Jatin Palafox  Surgery  [address], [address]  [place], [place] [address]  Phone: [phone]  Fax: [phone]  Follow Up Time: 1 week   Jatin Palafox  Surgery  [address], [address]  [place] [address]  Phone: [phone]  Fax: [phone]  Follow Up Time: 1 week    Yan Bloom  Vascular Surgery  [address]  [place] [address]  Phone: [phone]  Fax: [phone]  Follow Up Time: 1 week

## 2025-04-05 NOTE — ED PROVIDER NOTE - OBJECTIVE STATEMENT
The pt is a 70 y/o F, who presents to ED c/o lower abd pain x mon, worse over past few d. Has not seen pmd or gi for this. Pain to lower abd, constant, dull, non radiating, + nausea, has not taken any meds for symptoms. Denies fevers, chills, cp, sob, emesis, diarrhea, anorexia, dysuria.

## 2025-04-08 ENCOUNTER — APPOINTMENT (OUTPATIENT)
Dept: VASCULAR SURGERY | Facility: CLINIC | Age: 70
End: 2025-04-08
Payer: COMMERCIAL

## 2025-04-08 ENCOUNTER — NON-APPOINTMENT (OUTPATIENT)
Age: 70
End: 2025-04-08

## 2025-04-08 VITALS
WEIGHT: 107 LBS | SYSTOLIC BLOOD PRESSURE: 121 MMHG | DIASTOLIC BLOOD PRESSURE: 68 MMHG | BODY MASS INDEX: 16.79 KG/M2 | HEART RATE: 73 BPM | HEIGHT: 67 IN

## 2025-04-08 DIAGNOSIS — K55.1 CHRONIC VASCULAR DISORDERS OF INTESTINE: ICD-10-CM

## 2025-04-08 DIAGNOSIS — Z87.891 PERSONAL HISTORY OF NICOTINE DEPENDENCE: ICD-10-CM

## 2025-04-08 PROCEDURE — 99204 OFFICE O/P NEW MOD 45 MIN: CPT

## 2025-04-09 ENCOUNTER — TRANSCRIPTION ENCOUNTER (OUTPATIENT)
Age: 70
End: 2025-04-09

## 2025-04-14 ENCOUNTER — OUTPATIENT (OUTPATIENT)
Dept: OUTPATIENT SERVICES | Facility: HOSPITAL | Age: 70
LOS: 1 days | End: 2025-04-14
Payer: COMMERCIAL

## 2025-04-14 ENCOUNTER — APPOINTMENT (OUTPATIENT)
Dept: VASCULAR SURGERY | Facility: HOSPITAL | Age: 70
End: 2025-04-14

## 2025-04-14 VITALS
OXYGEN SATURATION: 98 % | DIASTOLIC BLOOD PRESSURE: 68 MMHG | HEART RATE: 81 BPM | TEMPERATURE: 99 F | RESPIRATION RATE: 18 BRPM | WEIGHT: 106.92 LBS | SYSTOLIC BLOOD PRESSURE: 133 MMHG | HEIGHT: 67 IN

## 2025-04-14 PROCEDURE — 37236 OPEN/PERQ PLACE STENT 1ST: CPT | Mod: GC,RT

## 2025-04-14 PROCEDURE — 76937 US GUIDE VASCULAR ACCESS: CPT | Mod: 26,GC

## 2025-04-14 PROCEDURE — C1874: CPT

## 2025-04-14 PROCEDURE — 76000 FLUOROSCOPY <1 HR PHYS/QHP: CPT

## 2025-04-14 PROCEDURE — 36245 INS CATH ABD/L-EXT ART 1ST: CPT | Mod: GC,RT

## 2025-04-14 PROCEDURE — C1769: CPT

## 2025-04-14 PROCEDURE — 75625 CONTRAST EXAM ABDOMINL AORTA: CPT | Mod: 26,59,GC

## 2025-04-14 PROCEDURE — 37236 OPEN/PERQ PLACE STENT 1ST: CPT

## 2025-04-14 PROCEDURE — C1887: CPT

## 2025-04-14 PROCEDURE — 75726 ARTERY X-RAYS ABDOMEN: CPT | Mod: 26,GC,59

## 2025-04-14 PROCEDURE — 85347 COAGULATION TIME ACTIVATED: CPT

## 2025-04-14 PROCEDURE — C1894: CPT

## 2025-04-14 RX ORDER — ACETAMINOPHEN 500 MG/5ML
725 LIQUID (ML) ORAL ONCE
Refills: 0 | Status: COMPLETED | OUTPATIENT
Start: 2025-04-14 | End: 2025-04-14

## 2025-04-14 RX ADMIN — Medication 290 MILLIGRAM(S): at 10:19

## 2025-04-14 RX ADMIN — Medication 40 MILLILITER(S): at 10:20

## 2025-04-14 RX ADMIN — Medication 725 MILLIGRAM(S): at 10:20

## 2025-04-14 NOTE — CHART NOTE - NSCHARTNOTEFT_GEN_A_CORE
Procedure: SMA stent  Surgeon: Dr Bloom    S: Pt has some arm pain associated with the access site but otherwise feels well. She tolerated food without any abdominal pain, nausea or vomiting.    O:  T(C): --  T(F): --  HR: --  BP: --  RR: --  SpO2: --  Wt(kg): --            Gen: NAD, resting comfortably in bed  C/V: NSR  Pulm: Nonlabored breathing, no respiratory distress  Abd: soft, NT/ND   Access site of RUE is CDI, no ecchymosis or swelling, palpable radial and ulnar pulses      A/P: 69yFemale s/p above procedure  Diet: Regular  IVF: none  Pain/nausea control  DVT ppx: early ambulation  Dispo plan: home

## 2025-04-14 NOTE — PRE-ANESTHESIA EVALUATION ADULT - NSANTHPEFT_GEN_ALL_CORE
well developed well nourished female, a little anxious  awake, alert, and oriented  +S1/S2  B/L air entry

## 2025-04-14 NOTE — BRIEF OPERATIVE NOTE - OPERATION/FINDINGS
R brachial artery access. Max sheath size 6F. Angiogram shows SMA stenosis, same as CT. Stented with a 6x22mm iCAST stent. No complications. Sheath left in place.

## 2025-04-17 DIAGNOSIS — K35.30 ACUTE APPENDICITIS WITH LOCALIZED PERITONITIS, WITHOUT PERFORATION OR GANGRENE: ICD-10-CM

## 2025-04-17 DIAGNOSIS — Z88.0 ALLERGY STATUS TO PENICILLIN: ICD-10-CM

## 2025-04-17 DIAGNOSIS — Z88.1 ALLERGY STATUS TO OTHER ANTIBIOTIC AGENTS: ICD-10-CM

## 2025-04-17 DIAGNOSIS — Z85.3 PERSONAL HISTORY OF MALIGNANT NEOPLASM OF BREAST: ICD-10-CM

## 2025-04-18 DIAGNOSIS — K55.1 CHRONIC VASCULAR DISORDERS OF INTESTINE: ICD-10-CM

## 2025-04-18 DIAGNOSIS — Z79.82 LONG TERM (CURRENT) USE OF ASPIRIN: ICD-10-CM

## 2025-04-18 DIAGNOSIS — Z79.811 LONG TERM (CURRENT) USE OF AROMATASE INHIBITORS: ICD-10-CM

## 2025-04-18 DIAGNOSIS — Z88.1 ALLERGY STATUS TO OTHER ANTIBIOTIC AGENTS: ICD-10-CM

## 2025-04-18 DIAGNOSIS — Z85.3 PERSONAL HISTORY OF MALIGNANT NEOPLASM OF BREAST: ICD-10-CM

## 2025-04-29 ENCOUNTER — NON-APPOINTMENT (OUTPATIENT)
Age: 70
End: 2025-04-29

## 2025-04-29 ENCOUNTER — APPOINTMENT (OUTPATIENT)
Dept: VASCULAR SURGERY | Facility: CLINIC | Age: 70
End: 2025-04-29
Payer: COMMERCIAL

## 2025-04-29 VITALS
HEIGHT: 67 IN | HEART RATE: 89 BPM | WEIGHT: 107 LBS | DIASTOLIC BLOOD PRESSURE: 87 MMHG | BODY MASS INDEX: 16.79 KG/M2 | SYSTOLIC BLOOD PRESSURE: 127 MMHG

## 2025-04-29 DIAGNOSIS — K55.1 CHRONIC VASCULAR DISORDERS OF INTESTINE: ICD-10-CM

## 2025-04-29 PROCEDURE — 93976 VASCULAR STUDY: CPT

## 2025-04-29 PROCEDURE — 99213 OFFICE O/P EST LOW 20 MIN: CPT

## 2025-05-02 ENCOUNTER — OUTPATIENT (OUTPATIENT)
Dept: OUTPATIENT SERVICES | Facility: HOSPITAL | Age: 70
LOS: 1 days | Discharge: HOME | End: 2025-05-02
Payer: COMMERCIAL

## 2025-05-02 ENCOUNTER — TRANSCRIPTION ENCOUNTER (OUTPATIENT)
Age: 70
End: 2025-05-02

## 2025-05-02 ENCOUNTER — APPOINTMENT (OUTPATIENT)
Dept: VASCULAR SURGERY | Facility: HOSPITAL | Age: 70
End: 2025-05-02

## 2025-05-02 PROCEDURE — C1769: CPT

## 2025-05-02 PROCEDURE — C1894: CPT

## 2025-05-02 PROCEDURE — 76000 FLUOROSCOPY <1 HR PHYS/QHP: CPT

## 2025-05-02 PROCEDURE — 75625 CONTRAST EXAM ABDOMINL AORTA: CPT | Mod: 26,GC

## 2025-05-02 PROCEDURE — 85347 COAGULATION TIME ACTIVATED: CPT

## 2025-05-02 PROCEDURE — C1887: CPT

## 2025-05-02 PROCEDURE — 85610 PROTHROMBIN TIME: CPT

## 2025-05-02 PROCEDURE — 36245 INS CATH ABD/L-EXT ART 1ST: CPT

## 2025-05-02 PROCEDURE — 76937 US GUIDE VASCULAR ACCESS: CPT | Mod: 26,GC,RT

## 2025-05-02 RX ORDER — SODIUM CHLORIDE 9 G/1000ML
1000 INJECTION, SOLUTION INTRAVENOUS
Refills: 0 | Status: DISCONTINUED | OUTPATIENT
Start: 2025-05-02 | End: 2025-05-02

## 2025-05-02 RX ADMIN — SODIUM CHLORIDE 500 MILLILITER(S): 9 INJECTION, SOLUTION INTRAVENOUS at 11:39

## 2025-05-02 NOTE — BRIEF OPERATIVE NOTE - OPERATION/FINDINGS
General anesthesia. 5F max sheath size. R brachial access. Diagnostic procedure showing occluded SMA stent. No procedure performed.

## 2025-05-02 NOTE — PROGRESS NOTE ADULT - SUBJECTIVE AND OBJECTIVE BOX
Vascular Surgery Post-Op Note    Procedure: mesenteric angiogram    Diagnosis/Indication: chronic mesenteric ischemia    Surgeon: Dr. Bloom    S: Pt has no complaints. Denies CP, SOB, MCNEILL, current abdominal pain, n/v, numbness or tingling of RUE. Pain controlled with medication.    O:    Gen: NAD, resting comfortably in bed  C/V: NSR  Pulm: Nonlabored breathing, no respiratory distress, on room air  Abd: soft, NT/ND  Extrem: RUE brachial access site c/d/i, coban in place. soft, no palpable hematoma. WWP, motor and sensation intact in all four extremities      A/P: 69yFemale s/p above procedure  Diet: regular  IVF: 1L LR prior to dc  Pain/nausea control  DVT ppx: holding  Dispo plan: 5 uris

## 2025-05-05 ENCOUNTER — NON-APPOINTMENT (OUTPATIENT)
Age: 70
End: 2025-05-05

## 2025-05-05 RX ORDER — ATORVASTATIN CALCIUM 10 MG/1
10 TABLET, FILM COATED ORAL
Qty: 90 | Refills: 3 | Status: ACTIVE | COMMUNITY
Start: 2025-05-05 | End: 1900-01-01

## 2025-05-05 RX ORDER — KRILL/OM-3/DHA/EPA/PHOSPHO/AST 1000-230MG
81 CAPSULE ORAL DAILY
Qty: 90 | Refills: 0 | Status: ACTIVE | COMMUNITY
Start: 2025-05-05 | End: 1900-01-01

## 2025-05-06 VITALS
TEMPERATURE: 98 F | SYSTOLIC BLOOD PRESSURE: 145 MMHG | RESPIRATION RATE: 18 BRPM | WEIGHT: 107.59 LBS | DIASTOLIC BLOOD PRESSURE: 80 MMHG | HEIGHT: 67.5 IN | HEART RATE: 86 BPM

## 2025-05-06 NOTE — PRE-OP CHECKLIST - HAND OFF
Add 52 Modifier (Optional): no Total Number Of Lesions Treated: 1 Render Post Care In The Note?: yes Consent: The patient's consent was obtained including but not limited to risks of crusting, scabbing, blistering, scarring, darker or lighter pigmentary change, recurrence, incomplete removal and infection. Number Of Freeze-Thaw Cycles: 2 freeze-thaw cycles Medical Necessity Clause: This procedure was medically necessary because the lesions that were treated were: Post-Care Instructions: I reviewed with the patient in detail post-care instructions. Patient is to wear sunprotection, and avoid picking at any of the treated lesions. Pt may apply Vaseline to crusted or scabbing areas. Detail Level: Detailed Medical Necessity Information: It is in your best interest to select a reason for this procedure from the list below. All of these items fulfill various CMS LCD requirements except the new and changing color options. Unit RN to OR RN

## 2025-05-06 NOTE — PRE-OP CHECKLIST - HEART RATE (BEATS/MIN)
on the discharge service for the patient. I have reviewed and made amendments to the documentation where necessary.
86

## 2025-05-06 NOTE — PATIENT PROFILE ADULT - FUNCTIONAL SCREEN CURRENT LEVEL: COMMUNICATION, MLM
Return OB Office Visit    CC:   Chief Complaint   Patient presents with    Routine Prenatal Visit       HPI:  Pt seen and examined. No concerns/complaints. Denies VB, LOF. +FM. Some contractions last night, not consistent. One or two this morning. Maternal wellness questionnaire reviewed - no concerns today. Score 0. Objective:  /75   Pulse 98   Temp 97.7 °F (36.5 °C)   Wt 264 lb 3.2 oz (119.8 kg)   LMP 2021   BMI 45.35 kg/m²   Gen: AO, NAD  Abd: Soft, NT  FHT: 135    Assessment/Plan:  30 y.o. [id number] at 36w6d (Estimated Date of Delivery: 10/20/21) presents for NAT appointment:      Diagnosis Orders   1. Prenatal care in third trimester     2. Smoking     3. Insulin controlled gestational diabetes mellitus (GDM) in third trimester     4. History of shoulder dystocia in prior pregnancy     5. Family history of cancer of female genital organ     6.  Acquired syphilis       Doing well, routine care  - BG well controlled, continue current regimen  - NST reactive, BRUNO normal  - GBS neg  - on acyclovir prophylaxis  - PLTCD 10/8/21 at 0800 for h/o shoulder dystocia with  injury, plan for opportunistic salpingectomy at that time as well (approved by ethics, will place consult on day of surgery)    Dispo: RTC in 3 days  Yovani Oro MD 0 = understands/communicates without difficulty

## 2025-05-07 ENCOUNTER — INPATIENT (INPATIENT)
Facility: HOSPITAL | Age: 70
LOS: 4 days | Discharge: HOME | End: 2025-05-12
Attending: SURGERY | Admitting: SURGERY
Payer: COMMERCIAL

## 2025-05-07 ENCOUNTER — APPOINTMENT (OUTPATIENT)
Dept: VASCULAR SURGERY | Facility: HOSPITAL | Age: 70
End: 2025-05-07

## 2025-05-07 ENCOUNTER — TRANSCRIPTION ENCOUNTER (OUTPATIENT)
Age: 70
End: 2025-05-07

## 2025-05-07 DIAGNOSIS — Z98.890 OTHER SPECIFIED POSTPROCEDURAL STATES: Chronic | ICD-10-CM

## 2025-05-07 DIAGNOSIS — K55.1 CHRONIC VASCULAR DISORDERS OF INTESTINE: Chronic | ICD-10-CM

## 2025-05-07 LAB
ANION GAP SERPL CALC-SCNC: 13 MMOL/L — SIGNIFICANT CHANGE UP (ref 5–17)
APTT BLD: 33.1 SEC — SIGNIFICANT CHANGE UP (ref 26.1–36.8)
BASE EXCESS BLDA CALC-SCNC: -2.4 MMOL/L — LOW (ref -2–3)
BLD GP AB SCN SERPL QL: NEGATIVE — SIGNIFICANT CHANGE UP
BUN SERPL-MCNC: 11 MG/DL — SIGNIFICANT CHANGE UP (ref 7–23)
CALCIUM SERPL-MCNC: 8.2 MG/DL — LOW (ref 8.4–10.5)
CHLORIDE SERPL-SCNC: 105 MMOL/L — SIGNIFICANT CHANGE UP (ref 96–108)
CO2 BLDA-SCNC: 24 MMOL/L — SIGNIFICANT CHANGE UP (ref 19–24)
CO2 SERPL-SCNC: 23 MMOL/L — SIGNIFICANT CHANGE UP (ref 22–31)
CREAT SERPL-MCNC: 0.46 MG/DL — LOW (ref 0.5–1.3)
EGFR: 104 ML/MIN/1.73M2 — SIGNIFICANT CHANGE UP
EGFR: 104 ML/MIN/1.73M2 — SIGNIFICANT CHANGE UP
GLUCOSE SERPL-MCNC: 131 MG/DL — HIGH (ref 70–99)
HCO3 BLDA-SCNC: 23 MMOL/L — SIGNIFICANT CHANGE UP (ref 21–28)
HCT VFR BLD CALC: 29.4 % — LOW (ref 34.5–45)
HGB BLD-MCNC: 9.6 G/DL — LOW (ref 11.5–15.5)
INR BLD: 1.12 — SIGNIFICANT CHANGE UP (ref 0.85–1.16)
LACTATE SERPL-SCNC: 0.7 MMOL/L — SIGNIFICANT CHANGE UP (ref 0.5–2)
MAGNESIUM SERPL-MCNC: 2.8 MG/DL — HIGH (ref 1.6–2.6)
MCHC RBC-ENTMCNC: 31.6 PG — SIGNIFICANT CHANGE UP (ref 27–34)
MCHC RBC-ENTMCNC: 32.7 G/DL — SIGNIFICANT CHANGE UP (ref 32–36)
MCV RBC AUTO: 96.7 FL — SIGNIFICANT CHANGE UP (ref 80–100)
NRBC BLD AUTO-RTO: 0 /100 WBCS — SIGNIFICANT CHANGE UP (ref 0–0)
PCO2 BLDA: 42 MMHG — SIGNIFICANT CHANGE UP (ref 32–45)
PH BLDA: 7.35 — SIGNIFICANT CHANGE UP (ref 7.35–7.45)
PHOSPHATE SERPL-MCNC: 3.1 MG/DL — SIGNIFICANT CHANGE UP (ref 2.5–4.5)
PLATELET # BLD AUTO: 439 K/UL — HIGH (ref 150–400)
PO2 BLDA: 151 MMHG — HIGH (ref 83–108)
POTASSIUM SERPL-MCNC: 3.7 MMOL/L — SIGNIFICANT CHANGE UP (ref 3.5–5.3)
POTASSIUM SERPL-SCNC: 3.7 MMOL/L — SIGNIFICANT CHANGE UP (ref 3.5–5.3)
PROTHROM AB SERPL-ACNC: 12.9 SEC — SIGNIFICANT CHANGE UP (ref 9.9–13.4)
RBC # BLD: 3.04 M/UL — LOW (ref 3.8–5.2)
RBC # FLD: 12.8 % — SIGNIFICANT CHANGE UP (ref 10.3–14.5)
RH IG SCN BLD-IMP: NEGATIVE — SIGNIFICANT CHANGE UP
SAO2 % BLDA: 100 % — HIGH (ref 94–98)
SODIUM SERPL-SCNC: 141 MMOL/L — SIGNIFICANT CHANGE UP (ref 135–145)
WBC # BLD: 10.49 K/UL — SIGNIFICANT CHANGE UP (ref 3.8–10.5)
WBC # FLD AUTO: 10.49 K/UL — SIGNIFICANT CHANGE UP (ref 3.8–10.5)

## 2025-05-07 PROCEDURE — 35631 BPG AOR-CELIAC-MSN-RENAL: CPT | Mod: RT,GC

## 2025-05-07 DEVICE — LIGATING CLIPS WECK HORIZON MEDIUM (BLUE) 24: Type: IMPLANTABLE DEVICE | Status: FUNCTIONAL

## 2025-05-07 DEVICE — IMPLANTABLE DEVICE: Type: IMPLANTABLE DEVICE | Status: FUNCTIONAL

## 2025-05-07 DEVICE — FELT PTFE 1/2 X 4": Type: IMPLANTABLE DEVICE | Status: FUNCTIONAL

## 2025-05-07 DEVICE — FELT PTFE 2 X 2": Type: IMPLANTABLE DEVICE | Status: FUNCTIONAL

## 2025-05-07 DEVICE — SURGCEL 4 X 8": Type: IMPLANTABLE DEVICE | Status: FUNCTIONAL

## 2025-05-07 DEVICE — LIGATING CLIPS WECK HORIZON LARGE (ORANGE) 24: Type: IMPLANTABLE DEVICE | Status: FUNCTIONAL

## 2025-05-07 DEVICE — LIGATING CLIPS WECK HORIZON SMALL (YELLOW) 24: Type: IMPLANTABLE DEVICE | Status: FUNCTIONAL

## 2025-05-07 RX ORDER — ATORVASTATIN CALCIUM 80 MG/1
10 TABLET, FILM COATED ORAL AT BEDTIME
Refills: 0 | Status: DISCONTINUED | OUTPATIENT
Start: 2025-05-07 | End: 2025-05-08

## 2025-05-07 RX ORDER — CEFAZOLIN SODIUM IN 0.9 % NACL 3 G/100 ML
2000 INTRAVENOUS SOLUTION, PIGGYBACK (ML) INTRAVENOUS EVERY 8 HOURS
Refills: 0 | Status: COMPLETED | OUTPATIENT
Start: 2025-05-07 | End: 2025-05-08

## 2025-05-07 RX ORDER — HYDROMORPHONE/SOD CHLOR,ISO/PF 2 MG/10 ML
0.5 SYRINGE (ML) INJECTION EVERY 4 HOURS
Refills: 0 | Status: DISCONTINUED | OUTPATIENT
Start: 2025-05-07 | End: 2025-05-07

## 2025-05-07 RX ORDER — OMEPRAZOLE 20 MG/1
1 CAPSULE, DELAYED RELEASE ORAL
Refills: 0 | DISCHARGE

## 2025-05-07 RX ORDER — ONDANSETRON HCL/PF 4 MG/2 ML
4 VIAL (ML) INJECTION EVERY 6 HOURS
Refills: 0 | Status: DISCONTINUED | OUTPATIENT
Start: 2025-05-07 | End: 2025-05-12

## 2025-05-07 RX ORDER — CEFAZOLIN SODIUM IN 0.9 % NACL 3 G/100 ML
1000 INTRAVENOUS SOLUTION, PIGGYBACK (ML) INTRAVENOUS EVERY 8 HOURS
Refills: 0 | Status: DISCONTINUED | OUTPATIENT
Start: 2025-05-07 | End: 2025-05-07

## 2025-05-07 RX ORDER — HYDROMORPHONE/SOD CHLOR,ISO/PF 2 MG/10 ML
30 SYRINGE (ML) INJECTION
Refills: 0 | Status: DISCONTINUED | OUTPATIENT
Start: 2025-05-07 | End: 2025-05-07

## 2025-05-07 RX ORDER — ACETAMINOPHEN 500 MG/5ML
725 LIQUID (ML) ORAL EVERY 6 HOURS
Refills: 0 | Status: COMPLETED | OUTPATIENT
Start: 2025-05-07 | End: 2025-05-08

## 2025-05-07 RX ORDER — BUPIVACAINE 13.3 MG/ML
20 INJECTION, SUSPENSION, LIPOSOMAL INFILTRATION ONCE
Refills: 0 | Status: DISCONTINUED | OUTPATIENT
Start: 2025-05-07 | End: 2025-05-07

## 2025-05-07 RX ORDER — ASPIRIN 325 MG
81 TABLET ORAL DAILY
Refills: 0 | Status: DISCONTINUED | OUTPATIENT
Start: 2025-05-07 | End: 2025-05-12

## 2025-05-07 RX ORDER — HALOPERIDOL 10 MG/1
1 TABLET ORAL ONCE
Refills: 0 | Status: COMPLETED | OUTPATIENT
Start: 2025-05-07 | End: 2025-05-07

## 2025-05-07 RX ORDER — HYDROMORPHONE/SOD CHLOR,ISO/PF 2 MG/10 ML
1 SYRINGE (ML) INJECTION ONCE
Refills: 0 | Status: DISCONTINUED | OUTPATIENT
Start: 2025-05-07 | End: 2025-05-07

## 2025-05-07 RX ORDER — NALOXONE HYDROCHLORIDE 0.4 MG/ML
0.1 INJECTION, SOLUTION INTRAMUSCULAR; INTRAVENOUS; SUBCUTANEOUS
Refills: 0 | Status: DISCONTINUED | OUTPATIENT
Start: 2025-05-07 | End: 2025-05-11

## 2025-05-07 RX ORDER — ATORVASTATIN CALCIUM 80 MG/1
1 TABLET, FILM COATED ORAL
Refills: 0 | DISCHARGE

## 2025-05-07 RX ORDER — HEPARIN SODIUM 1000 [USP'U]/ML
5000 INJECTION INTRAVENOUS; SUBCUTANEOUS EVERY 12 HOURS
Refills: 0 | Status: DISCONTINUED | OUTPATIENT
Start: 2025-05-07 | End: 2025-05-08

## 2025-05-07 RX ORDER — SENNA 187 MG
2 TABLET ORAL AT BEDTIME
Refills: 0 | Status: DISCONTINUED | OUTPATIENT
Start: 2025-05-07 | End: 2025-05-08

## 2025-05-07 RX ORDER — HEPARIN SODIUM 1000 [USP'U]/ML
5000 INJECTION INTRAVENOUS; SUBCUTANEOUS EVERY 12 HOURS
Refills: 0 | Status: DISCONTINUED | OUTPATIENT
Start: 2025-05-07 | End: 2025-05-07

## 2025-05-07 RX ORDER — ACETAMINOPHEN 500 MG/5ML
1000 LIQUID (ML) ORAL EVERY 6 HOURS
Refills: 0 | Status: DISCONTINUED | OUTPATIENT
Start: 2025-05-07 | End: 2025-05-07

## 2025-05-07 RX ORDER — CLEVIDIPINE BUTYRATE 50MG/100ML
2 VIAL (ML) INTRAVENOUS
Qty: 25 | Refills: 0 | Status: DISCONTINUED | OUTPATIENT
Start: 2025-05-07 | End: 2025-05-07

## 2025-05-07 RX ADMIN — HEPARIN SODIUM 5000 UNIT(S): 1000 INJECTION INTRAVENOUS; SUBCUTANEOUS at 19:26

## 2025-05-07 RX ADMIN — Medication 2 TABLET(S): at 21:54

## 2025-05-07 RX ADMIN — Medication 725 MILLIGRAM(S): at 22:30

## 2025-05-07 RX ADMIN — Medication 100 MILLIGRAM(S): at 22:16

## 2025-05-07 RX ADMIN — HALOPERIDOL 1 MILLIGRAM(S): 10 TABLET ORAL at 18:00

## 2025-05-07 RX ADMIN — Medication 60 MILLILITER(S): at 21:53

## 2025-05-07 RX ADMIN — Medication 1 MILLIGRAM(S): at 19:01

## 2025-05-07 RX ADMIN — Medication 290 MILLIGRAM(S): at 21:53

## 2025-05-07 RX ADMIN — ATORVASTATIN CALCIUM 10 MILLIGRAM(S): 80 TABLET, FILM COATED ORAL at 21:54

## 2025-05-07 RX ADMIN — Medication 1 MILLIGRAM(S): at 18:29

## 2025-05-07 RX ADMIN — Medication 81 MILLIGRAM(S): at 20:28

## 2025-05-07 NOTE — H&P ADULT - NSICDXPASTSURGICALHX_GEN_ALL_CORE_FT
PAST SURGICAL HISTORY:  Chronic mesenteric ischemia     History of appendectomy     History of lumpectomy left, s/p RT    History of surgery head and neck    History of surgical procedure mesenteric angio w/stent placement 4/14/25

## 2025-05-07 NOTE — H&P ADULT - ASSESSMENT
ASSESSMENT  68yo F with pmhx of Breast cancer (s/p lumpectomy, on Anastazole), osteoporosis, appendicitis (s/p lap appendectomy 4/5/25), recently diagnosed chronic mesenteric ischemia d/t severe SMA stenosis. Patient complaining of abdominal pain, weight loss, and food aversion since January of this year. Underwent mesenteric angio w/ SMA stent placement 4/14/25, had improvement in symptoms for few days and then symptoms returned, found to have occlusion of SMA stent on duplex, confirmed on dx mesenteric angio (5/2/25). Patient returns for aortic-mesenteric artery bypass. Is now s/p infrarenal AA bypass to SMA bypass with 8mm Hemashield graft.    PLAN    #CMI  - Now s/p infrarenal AA to SMA bypass  - Admit to 5 uris, vascular surgery, telemetry  - Postop lactate and ABG  - Serial abdominal exams overnight  - Continue ASA postop  - Will decide on long term anticoagulation on POD1    #Breast Cancer  - s/p lumpectomy  - Will hold today's dose of anastrazole and discuss when to resume

## 2025-05-07 NOTE — H&P ADULT - NSHPPHYSICALEXAM_GEN_ALL_CORE
Constitutional: Calm, pleasant, conversational  Cardiac: NSR  Respiratory: Equal and bilateral chest rise   Abdomen: Soft, NT, ND. Laparotomy incision is C/D/I.  Extremities: WWP  Vascular: Palpable pedal pulses bilaterally.

## 2025-05-07 NOTE — H&P ADULT - HISTORY OF PRESENT ILLNESS
70yo F with pmhx of Breast cancer (s/p lumpectomy, on Anastazole), osteoporosis, appendicitis (s/p lap appendectomy 4/5/25), recently diagnosed chronic mesenteric ischemia d/t severe SMA stenosis. Patient complaining of abdominal pain, weight loss, and food aversion since January of this year. Underwent mesenteric angio w/ SMA stent placement 4/14/25, had improvement in symptoms for few days and then symptoms returned, found to have occlusion of SMA stent on duplex, confirmed on dx mesenteric angio (5/2/25). Patient returns for aortic-mesenteric artery bypass.

## 2025-05-07 NOTE — BRIEF OPERATIVE NOTE - OPERATION/FINDINGS
General anesthesia. Laparotomy performed from xyphoid to infraumbilical region. Bowel run, appears well perfused. RP incised to expose infrarenal AA. SMA dissected and collateral vessels loop controlled. 8mm Hemashield graft utilized to perform end to side anastomosis with proximal end at the infrarenal aorta, and distal end with end to side anastomosis to the mid-portion of the SMA approximately 5cm from its origin. On completion, has a palpable graft pulse and palpable blood flow in the distal SMA. Abdominal fascia closed with 1-0 PDS, and skin with 4-0 monocryl. No complications were encountered.

## 2025-05-07 NOTE — PROGRESS NOTE ADULT - SUBJECTIVE AND OBJECTIVE BOX
POST-OPERATIVE NOTE    Procedure: Bypass of superior mesenteric artery    Diagnosis/Indication: chronic mesenteric ischemia    Surgeon: Wolf    S: Denies CP, SOB, N/V. c/o pain. PCA pump ordered.    O:  T(C): 36.3 (05-07-25 @ 19:32), Max: 36.5 (05-07-25 @ 17:37)  T(F): 97.4 (05-07-25 @ 19:32), Max: 97.7 (05-07-25 @ 17:37)  HR: 65 (05-07-25 @ 19:32) (57 - 65)  BP: 117/63 (05-07-25 @ 19:32) (109/60 - 123/66)  RR: 14 (05-07-25 @ 19:32) (12 - 20)  SpO2: 99% (05-07-25 @ 19:32) (99% - 100%)  Wt(kg): --                        9.6    10.49 )-----------( 439      ( 07 May 2025 18:13 )             29.4     05-07    141  |  105  |  11  ----------------------------<  131[H]  3.7   |  23  |  0.46[L]    Ca    8.2[L]      07 May 2025 18:13  Phos  3.1     05-07  Mg     2.8     05-07        Gen: NAD, resting comfortably in bed  Pulm: Nonlabored breathing, no respiratory distress  C/V: NSR  Abd: abdominal incision soft, c/d/i  Extrem: WWP. no edema. motor and sensory grossly intact. compartments soft  Pulses: DP/PT palpable b/l      A/P: 69yFemale s/p above procedure  Diet: NPO  IVF: NS 60cc/hr  Pain/nausea control

## 2025-05-07 NOTE — H&P ADULT - NSICDXPASTMEDICALHX_GEN_ALL_CORE_FT
PAST MEDICAL HISTORY:  Breast cancer left    History of Clostridium difficile infection 2017, 2019    OP (osteoporosis)     Superior mesenteric artery stenosis     Temporomandibular joint disorder (TMJ)

## 2025-05-08 LAB
ANION GAP SERPL CALC-SCNC: 11 MMOL/L — SIGNIFICANT CHANGE UP (ref 5–17)
APTT BLD: 29.8 SEC — SIGNIFICANT CHANGE UP (ref 26.1–36.8)
APTT BLD: 52.7 SEC — HIGH (ref 26.1–36.8)
BUN SERPL-MCNC: 12 MG/DL — SIGNIFICANT CHANGE UP (ref 7–23)
CALCIUM SERPL-MCNC: 9 MG/DL — SIGNIFICANT CHANGE UP (ref 8.4–10.5)
CHLORIDE SERPL-SCNC: 105 MMOL/L — SIGNIFICANT CHANGE UP (ref 96–108)
CO2 SERPL-SCNC: 23 MMOL/L — SIGNIFICANT CHANGE UP (ref 22–31)
CREAT SERPL-MCNC: 0.48 MG/DL — LOW (ref 0.5–1.3)
EGFR: 103 ML/MIN/1.73M2 — SIGNIFICANT CHANGE UP
EGFR: 103 ML/MIN/1.73M2 — SIGNIFICANT CHANGE UP
GLUCOSE SERPL-MCNC: 115 MG/DL — HIGH (ref 70–99)
HCT VFR BLD CALC: 32.3 % — LOW (ref 34.5–45)
HCT VFR BLD CALC: 33 % — LOW (ref 34.5–45)
HGB BLD-MCNC: 10.5 G/DL — LOW (ref 11.5–15.5)
HGB BLD-MCNC: 10.7 G/DL — LOW (ref 11.5–15.5)
INR BLD: 1.01 — SIGNIFICANT CHANGE UP (ref 0.85–1.16)
MAGNESIUM SERPL-MCNC: 2.3 MG/DL — SIGNIFICANT CHANGE UP (ref 1.6–2.6)
MCHC RBC-ENTMCNC: 31.8 G/DL — LOW (ref 32–36)
MCHC RBC-ENTMCNC: 32 PG — SIGNIFICANT CHANGE UP (ref 27–34)
MCHC RBC-ENTMCNC: 32.2 PG — SIGNIFICANT CHANGE UP (ref 27–34)
MCHC RBC-ENTMCNC: 33.1 G/DL — SIGNIFICANT CHANGE UP (ref 32–36)
MCV RBC AUTO: 100.6 FL — HIGH (ref 80–100)
MCV RBC AUTO: 97.3 FL — SIGNIFICANT CHANGE UP (ref 80–100)
NRBC BLD AUTO-RTO: 0 /100 WBCS — SIGNIFICANT CHANGE UP (ref 0–0)
NRBC BLD AUTO-RTO: 0 /100 WBCS — SIGNIFICANT CHANGE UP (ref 0–0)
PHOSPHATE SERPL-MCNC: 3.6 MG/DL — SIGNIFICANT CHANGE UP (ref 2.5–4.5)
PLATELET # BLD AUTO: 456 K/UL — HIGH (ref 150–400)
PLATELET # BLD AUTO: 472 K/UL — HIGH (ref 150–400)
POTASSIUM SERPL-MCNC: 4.3 MMOL/L — SIGNIFICANT CHANGE UP (ref 3.5–5.3)
POTASSIUM SERPL-SCNC: 4.3 MMOL/L — SIGNIFICANT CHANGE UP (ref 3.5–5.3)
PROTHROM AB SERPL-ACNC: 11.6 SEC — SIGNIFICANT CHANGE UP (ref 9.9–13.4)
RBC # BLD: 3.28 M/UL — LOW (ref 3.8–5.2)
RBC # BLD: 3.32 M/UL — LOW (ref 3.8–5.2)
RBC # FLD: 13 % — SIGNIFICANT CHANGE UP (ref 10.3–14.5)
RBC # FLD: 13.1 % — SIGNIFICANT CHANGE UP (ref 10.3–14.5)
SODIUM SERPL-SCNC: 139 MMOL/L — SIGNIFICANT CHANGE UP (ref 135–145)
WBC # BLD: 12.23 K/UL — HIGH (ref 3.8–10.5)
WBC # BLD: 13.24 K/UL — HIGH (ref 3.8–10.5)
WBC # FLD AUTO: 12.23 K/UL — HIGH (ref 3.8–10.5)
WBC # FLD AUTO: 13.24 K/UL — HIGH (ref 3.8–10.5)

## 2025-05-08 PROCEDURE — 99223 1ST HOSP IP/OBS HIGH 75: CPT

## 2025-05-08 PROCEDURE — 93010 ELECTROCARDIOGRAM REPORT: CPT

## 2025-05-08 RX ORDER — POTASSIUM CHLORIDE, DEXTROSE MONOHYDRATE AND SODIUM CHLORIDE 150; 5; 900 MG/100ML; G/100ML; MG/100ML
1000 INJECTION, SOLUTION INTRAVENOUS
Refills: 0 | Status: DISCONTINUED | OUTPATIENT
Start: 2025-05-08 | End: 2025-05-10

## 2025-05-08 RX ORDER — ACETAMINOPHEN 500 MG/5ML
725 LIQUID (ML) ORAL ONCE
Refills: 0 | Status: COMPLETED | OUTPATIENT
Start: 2025-05-08 | End: 2025-05-08

## 2025-05-08 RX ORDER — HYDROMORPHONE/SOD CHLOR,ISO/PF 2 MG/10 ML
0.2 SYRINGE (ML) INJECTION ONCE
Refills: 0 | Status: DISCONTINUED | OUTPATIENT
Start: 2025-05-08 | End: 2025-05-08

## 2025-05-08 RX ORDER — HYDROMORPHONE/SOD CHLOR,ISO/PF 2 MG/10 ML
30 SYRINGE (ML) INJECTION
Refills: 0 | Status: DISCONTINUED | OUTPATIENT
Start: 2025-05-08 | End: 2025-05-08

## 2025-05-08 RX ORDER — HYDROMORPHONE/SOD CHLOR,ISO/PF 2 MG/10 ML
0.2 SYRINGE (ML) INJECTION EVERY 4 HOURS
Refills: 0 | Status: DISCONTINUED | OUTPATIENT
Start: 2025-05-08 | End: 2025-05-08

## 2025-05-08 RX ORDER — HEPARIN SODIUM 1000 [USP'U]/ML
900 INJECTION INTRAVENOUS; SUBCUTANEOUS
Qty: 25000 | Refills: 0 | Status: DISCONTINUED | OUTPATIENT
Start: 2025-05-08 | End: 2025-05-09

## 2025-05-08 RX ORDER — ACETAMINOPHEN 500 MG/5ML
725 LIQUID (ML) ORAL ONCE
Refills: 0 | Status: DISCONTINUED | OUTPATIENT
Start: 2025-05-08 | End: 2025-05-08

## 2025-05-08 RX ORDER — OXYCODONE HYDROCHLORIDE 30 MG/1
5 TABLET ORAL EVERY 6 HOURS
Refills: 0 | Status: DISCONTINUED | OUTPATIENT
Start: 2025-05-08 | End: 2025-05-08

## 2025-05-08 RX ORDER — OXYCODONE HYDROCHLORIDE 30 MG/1
2.5 TABLET ORAL EVERY 6 HOURS
Refills: 0 | Status: DISCONTINUED | OUTPATIENT
Start: 2025-05-08 | End: 2025-05-08

## 2025-05-08 RX ORDER — HYDROMORPHONE/SOD CHLOR,ISO/PF 2 MG/10 ML
0.5 SYRINGE (ML) INJECTION EVERY 4 HOURS
Refills: 0 | Status: DISCONTINUED | OUTPATIENT
Start: 2025-05-08 | End: 2025-05-08

## 2025-05-08 RX ORDER — SCOPOLAMINE 1 MG/3D
1 PATCH, EXTENDED RELEASE TRANSDERMAL
Refills: 0 | Status: DISCONTINUED | OUTPATIENT
Start: 2025-05-08 | End: 2025-05-11

## 2025-05-08 RX ORDER — HEPARIN SODIUM 1000 [USP'U]/ML
9 INJECTION INTRAVENOUS; SUBCUTANEOUS
Qty: 25000 | Refills: 0 | Status: DISCONTINUED | OUTPATIENT
Start: 2025-05-08 | End: 2025-05-08

## 2025-05-08 RX ORDER — METOCLOPRAMIDE HCL 10 MG
10 TABLET ORAL ONCE
Refills: 0 | Status: COMPLETED | OUTPATIENT
Start: 2025-05-08 | End: 2025-05-08

## 2025-05-08 RX ORDER — ACETAMINOPHEN 500 MG/5ML
1000 LIQUID (ML) ORAL EVERY 6 HOURS
Refills: 0 | Status: DISCONTINUED | OUTPATIENT
Start: 2025-05-08 | End: 2025-05-08

## 2025-05-08 RX ORDER — LIDOCAINE HYDROCHLORIDE 20 MG/ML
2 JELLY TOPICAL DAILY
Refills: 0 | Status: DISCONTINUED | OUTPATIENT
Start: 2025-05-08 | End: 2025-05-12

## 2025-05-08 RX ADMIN — Medication 725 MILLIGRAM(S): at 23:30

## 2025-05-08 RX ADMIN — Medication 10 MILLIGRAM(S): at 01:27

## 2025-05-08 RX ADMIN — Medication 290 MILLIGRAM(S): at 23:00

## 2025-05-08 RX ADMIN — HEPARIN SODIUM 9 UNIT(S)/HR: 1000 INJECTION INTRAVENOUS; SUBCUTANEOUS at 16:49

## 2025-05-08 RX ADMIN — LIDOCAINE HYDROCHLORIDE 2 PATCH: 20 JELLY TOPICAL at 18:15

## 2025-05-08 RX ADMIN — Medication 725 MILLIGRAM(S): at 13:44

## 2025-05-08 RX ADMIN — Medication 4 MILLIGRAM(S): at 06:19

## 2025-05-08 RX ADMIN — Medication 30 MILLILITER(S): at 10:21

## 2025-05-08 RX ADMIN — Medication 0.2 MILLIGRAM(S): at 01:24

## 2025-05-08 RX ADMIN — Medication 40 MILLIGRAM(S): at 06:08

## 2025-05-08 RX ADMIN — SCOPOLAMINE 1 PATCH: 1 PATCH, EXTENDED RELEASE TRANSDERMAL at 19:10

## 2025-05-08 RX ADMIN — Medication 290 MILLIGRAM(S): at 06:08

## 2025-05-08 RX ADMIN — LIDOCAINE HYDROCHLORIDE 2 PATCH: 20 JELLY TOPICAL at 11:42

## 2025-05-08 RX ADMIN — Medication 4 MILLIGRAM(S): at 12:47

## 2025-05-08 RX ADMIN — SCOPOLAMINE 1 PATCH: 1 PATCH, EXTENDED RELEASE TRANSDERMAL at 18:49

## 2025-05-08 RX ADMIN — Medication 0.2 MILLIGRAM(S): at 01:39

## 2025-05-08 RX ADMIN — Medication 290 MILLIGRAM(S): at 11:42

## 2025-05-08 RX ADMIN — Medication 4 MILLIGRAM(S): at 00:29

## 2025-05-08 RX ADMIN — Medication 100 MILLIGRAM(S): at 05:40

## 2025-05-08 RX ADMIN — Medication 725 MILLIGRAM(S): at 07:00

## 2025-05-08 RX ADMIN — Medication 290 MILLIGRAM(S): at 17:21

## 2025-05-08 RX ADMIN — POTASSIUM CHLORIDE, DEXTROSE MONOHYDRATE AND SODIUM CHLORIDE 60 MILLILITER(S): 150; 5; 900 INJECTION, SOLUTION INTRAVENOUS at 10:20

## 2025-05-08 RX ADMIN — HEPARIN SODIUM 5000 UNIT(S): 1000 INJECTION INTRAVENOUS; SUBCUTANEOUS at 07:21

## 2025-05-08 RX ADMIN — LIDOCAINE HYDROCHLORIDE 2 PATCH: 20 JELLY TOPICAL at 23:22

## 2025-05-08 RX ADMIN — Medication 81 MILLIGRAM(S): at 11:41

## 2025-05-08 NOTE — PROGRESS NOTE ADULT - SUBJECTIVE AND OBJECTIVE BOX
O/N: POC wnl. post op labs wnl. c/o pain, PCA pump started, 0.2 mg dilaudid given before pump arrived. zofran and reglan for nausea. BRETT wnl    ---------------------------------------------------------------------------  PLEASE CHECK WHEN PRESENT:  [  ]Heart Failure     [  ] Acute       [  ] Acute on Chronic       [  ] Chronic       [  ] Diastolic [HFpEF]       [  ] Systolic [HFrEF]       [  ] Combined [HFpEF & HFrEF]  ---------------------------------------------------------------------------  [  ] Hypertensive Heart Disease  [  ] CAD  [  ] Type 2 MI  [  ] Atrial Fibrillation     [  ] Paroxysmal A-fib     [  ] Chronic A-fib     [  ] Persistent A-fib     [  ] Longstanding Persistent A-fib     [  ] Permanent A-fib  [  ] Pulmonary Hypertension  -------------------------------------------------------------------  [  ] Acute Respiratory Failure with  [  ] Hypoxia  [  ] Hypercapnia  [  ] Asthma with Acute Exacerbation  [  ] COPD with Acute Exacerbation  [  ] COPD on home O2 (Chronic Respiratory Failure)  [  ] Atelectasis  [  ] Pleural Effusion  [  ] Acute Pulmonary Embolism  [  ] Acute Cor Pulmonale  [  ] Obstructive Sleep Apnea  -------------------------------------------------------------------  [  ] Acute Kidney Injury      [  ] Acute Tubular Necrosis   [  ] Chronic Kidney Disease     [  ] CKD 1  [  ] CKD 2  [  ] CKD 3     [  ] CKD 4  [  ] CKD 5 (ESRD)  [  ] Fluid Overload  [  ] Acid/Base Disorder     [  ] Acidosis       [  ] Alkalosis  [  ] Electrolyte Abnormalities     [  ] Hyponatremia  [  ] Hypernatremia     [  ] Hypokalemia  [  ] Hyperkalemia     [  ] Hypomagnesemia     [  ] Hypophosphatemia  [  ] Hyperphosphatemia  -------------------------------------------------------------------  [  ] Diabetes  [  ] Type 1  [  ] Type 2  [  ] Diabetes with  [  ] Hypoglycemia  [  ] Hyperglycemia  [  ] Diabetes with Neuropathy  [  ] Neuropathic Ulcer due to Diabetes  [  ] Diabetes with Peripheral Angiopathy  [  ] Diabetic Ischemic Ulcer  [  ] Diabetic Foot Infection  [  ] Osteomyelitis due to Diabetes  --------------------------------------------------------------------  [  ] SIRS     [  ] Sepsis  [  ] Severe Sepsis  [  ] Septic Shock     [  ] Noninfectious SIRS  [  ] Pneumonia     [  ] Hospital Acquired Pneumonia     [  ] Aspiration Pneumonia     [  ] Pneumonia due to:  [  ]  UTI  ------------------------------------------------------------------------  [  ] Acute blood loss anemia  [  ] Post op blood loss anemia  [  ] Iron deficiency anemia  [  ] Folate deficiency anemia  [  ] B12 deficiency anemia  [  ] Anemia due to chronic disease  [  ] Thrombocytopenia  [  ] Hypercoagulable state  [  ] Acute DVT  ----------------------------------------------------------------------  [  ] Cerebrovascular Accident  [  ] Transient Ischemic Attack  [  ] Encephalopathy     [  ] Metabolic  [  ] Toxic  [  ] Toxic-Metabolic     [  ] Septic  [  ] Uremic  [  ] Hepatic  [  ] Hypertensive  ----------------------------------------------------------------------  [  ] Malnutrition: See Nutrition Note     [  ] Supplement Ordered:  [  ] Morbid Obesity (BMI >=40)  [  ] Ileus  ---------------------------------------------------------------------  [  ] Smoker  [  ] Former  [  ] Current  [  ] Functional Quadriplegia  [  ] Moderate Depression  [  ] Homelessness  [  ] Sheltered   [  ] Unsheltered       A/P: 70yo F with pmhx of Breast cancer (s/p lumpectomy, on Anastazole), osteoporosis, appendicitis (s/p lap appendectomy 4/5/25), recently diagnosed chronic mesenteric ischemia d/t severe SMA stenosis. Patient complaining of abdominal pain, weight loss, and food aversion since January of this year. Underwent mesenteric angio w/ SMA stent placement 4/14/25, had improvement in symptoms for few days and then symptoms returned, found to have occlusion of SMA stent on duplex, confirmed on dx mesenteric angio (5/2/25). Patient returns for aortic-mesenteric artery bypass. Is now s/p infrarenal AA bypass to SMA bypass with 8mm Hemashield graft.    #CMI  - s/p infrarenal AA to SMA bypass 5/7  - Serial abdominal exams   - c/w periop ancef  - c/w ASA and statin  - nausea and pain control  - bowel regimen    #Breast Cancer s/p lumpectomy  - on anastrazole at home, f/u when to resume    Activity: bedrest  Diet: NPO  DvtPPX: f/u POD1  Dispo:  O/N: POC wnl. post op labs wnl. c/o pain, PCA pump started, 0.2 mg dilaudid given before pump arrived. zofran and reglan for nausea. BRETT wnl      Subjective: patient seen and examined at bedside. states abdominal pain is 3/10 and has improved. had one episode of nausea overnight however denies vomiting. has not passed flatus or had BM as of yet.    ROS: Denies headache, blurred vision, chest pain, SOB, abdominal pain, nausea or vomiting       Social   acetaminophen   IVPB .. 725 milliGRAM(s) IV Intermittent every 6 hours  aspirin  chewable 81 milliGRAM(s) Oral daily  atorvastatin 10 milliGRAM(s) Oral at bedtime  heparin   Injectable 5000 Unit(s) SubCutaneous every 12 hours  HYDROmorphone  Injectable 0.2 milliGRAM(s) IV Push every 4 hours PRN  naloxone Injectable 0.1 milliGRAM(s) IV Push every 3 minutes PRN  ondansetron Injectable 4 milliGRAM(s) IV Push every 6 hours PRN  oxyCODONE    IR 2.5 milliGRAM(s) Oral every 6 hours PRN  oxyCODONE    IR 5 milliGRAM(s) Oral every 6 hours PRN  pantoprazole    Tablet 40 milliGRAM(s) Oral before breakfast  senna 2 Tablet(s) Oral at bedtime  sodium chloride 0.9%. 1000 milliLiter(s) IV Continuous <Continuous>  acetaminophen   IVPB .. 725 milliGRAM(s) IV Intermittent every 6 hours  aspirin  chewable 81 milliGRAM(s) Oral daily  atorvastatin 10 milliGRAM(s) Oral at bedtime  heparin   Injectable 5000 Unit(s) SubCutaneous every 12 hours  HYDROmorphone  Injectable 0.2 milliGRAM(s) IV Push every 4 hours PRN  naloxone Injectable 0.1 milliGRAM(s) IV Push every 3 minutes PRN  ondansetron Injectable 4 milliGRAM(s) IV Push every 6 hours PRN  oxyCODONE    IR 2.5 milliGRAM(s) Oral every 6 hours PRN  oxyCODONE    IR 5 milliGRAM(s) Oral every 6 hours PRN  pantoprazole    Tablet 40 milliGRAM(s) Oral before breakfast  senna 2 Tablet(s) Oral at bedtime  sodium chloride 0.9%. 1000 milliLiter(s) IV Continuous <Continuous>      Allergies    erythromycin (Hives)  denosumab (Unknown)  Prolia (Other)  Augmentin (Unknown)  azithromycin (Hives)  vancomycin (Hives)    Intolerances        Vital Signs Last 24 Hrs  T(C): 37.3 (08 May 2025 04:42), Max: 37.3 (08 May 2025 04:42)  T(F): 99.2 (08 May 2025 04:42), Max: 99.2 (08 May 2025 04:42)  HR: 70 (08 May 2025 03:35) (57 - 86)  BP: 122/64 (08 May 2025 03:35) (109/60 - 145/80)  BP(mean): 85 (08 May 2025 03:35) (80 - 89)  RR: 16 (08 May 2025 03:35) (12 - 20)  SpO2: 95% (08 May 2025 03:35) (95% - 100%)    Parameters below as of 08 May 2025 03:35  Patient On (Oxygen Delivery Method): room air      I&O's Summary    07 May 2025 07:01  -  08 May 2025 06:56  --------------------------------------------------------  IN: 1617.5 mL / OUT: 1275 mL / NET: 342.5 mL          Physical Exam:  General: NAD, resting comfortably in bed  Pulmonary: No respiratory distress, nonlabored breathing, on room air  Cardiovascular: NSR  Abdominal: Soft, ND, appropriately tender to palpation. midline incision c/d/i.  Extremities: WWP      LABS:                        9.6    10.49 )-----------( 439      ( 07 May 2025 18:13 )             29.4     05-07    141  |  105  |  11  ----------------------------<  131[H]  3.7   |  23  |  0.46[L]    Ca    8.2[L]      07 May 2025 18:13  Phos  3.1     05-07  Mg     2.8     05-07      PT/INR - ( 07 May 2025 18:13 )   PT: 12.9 sec;   INR: 1.12          PTT - ( 07 May 2025 18:13 )  PTT:33.1 sec            ---------------------------------------------------------------------------  PLEASE CHECK WHEN PRESENT:  [  ]Heart Failure     [  ] Acute       [  ] Acute on Chronic       [  ] Chronic       [  ] Diastolic [HFpEF]       [  ] Systolic [HFrEF]       [  ] Combined [HFpEF & HFrEF]  ---------------------------------------------------------------------------  [  ] Hypertensive Heart Disease  [  ] CAD  [  ] Type 2 MI  [  ] Atrial Fibrillation     [  ] Paroxysmal A-fib     [  ] Chronic A-fib     [  ] Persistent A-fib     [  ] Longstanding Persistent A-fib     [  ] Permanent A-fib  [  ] Pulmonary Hypertension  -------------------------------------------------------------------  [  ] Acute Respiratory Failure with  [  ] Hypoxia  [  ] Hypercapnia  [  ] Asthma with Acute Exacerbation  [  ] COPD with Acute Exacerbation  [  ] COPD on home O2 (Chronic Respiratory Failure)  [  ] Atelectasis  [  ] Pleural Effusion  [  ] Acute Pulmonary Embolism  [  ] Acute Cor Pulmonale  [  ] Obstructive Sleep Apnea  -------------------------------------------------------------------  [  ] Acute Kidney Injury      [  ] Acute Tubular Necrosis   [  ] Chronic Kidney Disease     [  ] CKD 1  [  ] CKD 2  [  ] CKD 3     [  ] CKD 4  [  ] CKD 5 (ESRD)  [  ] Fluid Overload  [  ] Acid/Base Disorder     [  ] Acidosis       [  ] Alkalosis  [  ] Electrolyte Abnormalities     [  ] Hyponatremia  [  ] Hypernatremia     [  ] Hypokalemia  [  ] Hyperkalemia     [  ] Hypomagnesemia     [  ] Hypophosphatemia  [  ] Hyperphosphatemia  -------------------------------------------------------------------  [  ] Diabetes  [  ] Type 1  [  ] Type 2  [  ] Diabetes with  [  ] Hypoglycemia  [  ] Hyperglycemia  [  ] Diabetes with Neuropathy  [  ] Neuropathic Ulcer due to Diabetes  [  ] Diabetes with Peripheral Angiopathy  [  ] Diabetic Ischemic Ulcer  [  ] Diabetic Foot Infection  [  ] Osteomyelitis due to Diabetes  --------------------------------------------------------------------  [  ] SIRS     [  ] Sepsis  [  ] Severe Sepsis  [  ] Septic Shock     [  ] Noninfectious SIRS  [  ] Pneumonia     [  ] Hospital Acquired Pneumonia     [  ] Aspiration Pneumonia     [  ] Pneumonia due to:  [  ]  UTI  ------------------------------------------------------------------------  [  ] Acute blood loss anemia  [  ] Post op blood loss anemia  [  ] Iron deficiency anemia  [  ] Folate deficiency anemia  [  ] B12 deficiency anemia  [  ] Anemia due to chronic disease  [  ] Thrombocytopenia  [  ] Hypercoagulable state  [  ] Acute DVT  ----------------------------------------------------------------------  [  ] Cerebrovascular Accident  [  ] Transient Ischemic Attack  [  ] Encephalopathy     [  ] Metabolic  [  ] Toxic  [  ] Toxic-Metabolic     [  ] Septic  [  ] Uremic  [  ] Hepatic  [  ] Hypertensive  ----------------------------------------------------------------------  [  ] Malnutrition: See Nutrition Note     [  ] Supplement Ordered:  [  ] Morbid Obesity (BMI >=40)  [  ] Ileus  ---------------------------------------------------------------------  [  ] Smoker  [  ] Former  [  ] Current  [  ] Functional Quadriplegia  [  ] Moderate Depression  [  ] Homelessness  [  ] Sheltered   [  ] Unsheltered       A/P: 68yo F with pmhx of Breast cancer (s/p lumpectomy, on Anastazole), osteoporosis, appendicitis (s/p lap appendectomy 4/5/25), recently diagnosed chronic mesenteric ischemia d/t severe SMA stenosis. Patient complaining of abdominal pain, weight loss, and food aversion since January of this year. Underwent mesenteric angio w/ SMA stent placement 4/14/25, had improvement in symptoms for few days and then symptoms returned, found to have occlusion of SMA stent on duplex, confirmed on dx mesenteric angio (5/2/25). Patient returns for aortic-mesenteric artery bypass. Is now s/p infrarenal AA bypass to SMA bypass with 8mm Hemashield graft.    #Chronic Mesenteric Ischemia  - s/p infrarenal abdominal aorta to SMA bypass 5/7  - Serial abdominal exams   - c/w ASA and statin  - nausea and pain control  - bowel regimen    #Breast Cancer s/p lumpectomy  - on anastrazole at home, f/u when to resume    Activity: as marlen  Diet: NPO + IVF  DVTppx: SQH  Dispo: 8lach

## 2025-05-08 NOTE — DIETITIAN INITIAL EVALUATION ADULT - OTHER INFO
"Ms. Hair is a 70 yo F with a PMH of breast cancer (s/p lumpectomy, currently on anastrazole), osteoporosis, and recently diagnosed chronic mesenteric ischemia presented for elective aortic-mesenteric artery bypass completed on 5/7."     Visited pt at bedside on 8LA for initial assessment. States that at baseline pt consumes 2 meals a day. States that her intake and appetite had been decreasing since december 2024. Does not follow any therapeutic diet at home. No cultural, ethnic, Catholic food preferences noted. Confirms No known food allergies. States that she takes magnesium, calcium, vitamin D at home. Reports no additional use of oral nutritional supplement. No difficulties chewing or swallowing reported per pt at time of visit. States that she has been experiencing nausea, being managed with medication. No issues with vomiting, constipation and diarrhea at this time. Current diet: NPO. States that she has not had anything to consume this morning, continues to experience nausea. Current diet: 5/7 107 pounds, UBW: ~105 pounds, IBW: 138 pounds, %IBW: 77%. Observed with moderate depletions: temples, orbital, buccal, shoulder, clavicle per flow sheets. 0/10 pain per flow sheets; No Pressure Injuries per flow sheets. Immanuel Score: 19; No Edema per flow sheets. Meds reviewed. Zofran PRN. Protonix, Dextrose 5%, NaCl .45% + KCl 20mEq. Nutritionally Pertinent labs 5/8 Creat: .48 (Low), Gluc: 115 (High). See Nutrition recommendations below.

## 2025-05-08 NOTE — CONSULT NOTE ADULT - ASSESSMENT
Ms. Hair is a 70 yo F with a PMH of breast cancer (s/p lumpectomy, currently on anastrazole), osteoporosis, and recently diagnosed chronic mesenteric ischemia presented for elective aortic-mesenteric artery bypass completed on 5/7. Medicine following for comanagement.     #SMA occlusion   #Chronic mesenteric ischemia   -s/p aorta-mesentery bypass by vascular   -pain mgmt per vascular team, planned to trial PCA   -started on ASA 81 mg daily, per vascular team considering also starting eliquis due to her underlying hypercoagulable state with cancer history, anastrazole use, and speed with which SMA stent had occluded after placement     #Breast cancer   -takes anastrazole, holding here while admitted  -awaiting call back from primary oncologist Dr. Anuel Victor to discuss necessity of anastrazole/any possible alternatives in case the medication may be contributing to propensity for clots, failure of prior SMA stent     #Leukocytosis  -no infectious signs, likely reactive from bypass procedure   -continue to monitor, if fevers then collect CXR, UA, blood cultures     #Macrocytic anemia   -would check vitamin B12, folate, iron studies, reticulocytes  -monitor on daily CBC, transfuse for hemoglobin of 7 or lower

## 2025-05-08 NOTE — DIETITIAN INITIAL EVALUATION ADULT - OTHER CALCULATIONS
Ideal body weight (62.5kg) used to calculate energy needs due to pt's current body weight below % (77%) ideal body weight. Needs adjusted for age, malnutrition.

## 2025-05-08 NOTE — DIETITIAN INITIAL EVALUATION ADULT - NUTRITION CONSULT
Losartan. Nifedipine    Home BP readings -120-130/60-70  I suggest continuation of ---Nifedipine ----- during the entire perioperative period. I suggest holding -----Losartan--- on the morning of the surgery and can continue that  post operatively under blood pressure, electrolyte and renal function monitoring as long as they are acceptable.I suggest addressing pain control as uncontrolled pain can increased blood pressure     no

## 2025-05-08 NOTE — DIETITIAN NUTRITION RISK NOTIFICATION - ADDITIONAL COMMENTS/DIETITIAN RECOMMENDATIONS
Visited pt at bedside on 8LA for initial assessment. States that at baseline pt consumes 2 meals a day. States that her intake and appetite had been decreasing since december 2024. Does not follow any therapeutic diet at home. No cultural, ethnic, Adventist food preferences noted. Confirms No known food allergies. States that she takes magnesium, calcium, vitamin D at home. Reports no additional use of oral nutritional supplement. No difficulties chewing or swallowing reported per pt at time of visit. States that she has been experiencing nausea, being managed with medication. No issues with vomiting, constipation and diarrhea at this time. Current diet: NPO. States that she has not had anything to consume this morning, continues to experience nausea. Current diet: 5/7 107 pounds, UBW: ~105 pounds, IBW: 138 pounds, %IBW: 77%. Observed with moderate depletions: temples, orbital, buccal, shoulder, clavicle per flow sheets. 0/10 pain per flow sheets; No Pressure Injuries per flow sheets. Immanuel Score: 19; No Edema per flow sheets. Meds reviewed. Zofran PRN. Protonix, Dextrose 5%, NaCl .45% + KCl 20mEq. Nutritionally Pertinent labs 5/8 Creat: .48 (Low), Gluc: 115 (High). See Nutrition recommendations below.    **as medically feasible, advance diet as tolerated to Regular diet, provide nourishments and/or oral nutrition supplements per pt preference  2. Monitor PO intake, diet tolerance, weight trends, labs, and skin integrity and bowel movement regularity.   3. Encourage PO intake and honor food preferences as able unless otherwise contraindicated.   4. RDN will continue to monitor, reassess, and intervene as appropriate.     Discussed with pt about NPO status. Discussed about possible diet progression as nausea is relived. Pt receptive and verbalizes understandin

## 2025-05-08 NOTE — DIETITIAN INITIAL EVALUATION ADULT - EDUCATION DIETARY MODIFICATIONS
Discussed with pt about NPO status. Discussed about possible diet progression as nausea is relived. Pt receptive and verbalizes understanding./(2) meets goals/outcomes/verbalization

## 2025-05-08 NOTE — CONSULT NOTE ADULT - SUBJECTIVE AND OBJECTIVE BOX
HPI:     Ms. Hair is a 70 yo F with a PMH of breast cancer (s/p lumpectomy, currently on anastrazole), osteoporosis, and recently diagnosed chronic mesenteric ischemia presented for elective aortic-mesenteric artery bypass. Of note, patient's mesenteric ischemia was recently diagnosed after patient was having several months of abdominal pain with weight loss and food aversion. Had mesenteric angiogram last month during which a stent was placed in the SMA. Symptoms improved for a few days but then returned afterward and patient's stent was found to be thrombosed on duplex, confirmed on repeat mesenteric angiogram one week ago.     Underwent aortic-mesenteric artery bypass on 5/7, uncomplicated. Seen at bedside this morning, uncomfortable due to moderate abdominal pain and having some nausea.       MEDICATIONS  (STANDING):  acetaminophen   IVPB .. 725 milliGRAM(s) IV Intermittent every 6 hours  aspirin  chewable 81 milliGRAM(s) Oral daily  dextrose 5% + sodium chloride 0.45% with potassium chloride 20 mEq/L 1000 milliLiter(s) (60 mL/Hr) IV Continuous <Continuous>  heparin   Injectable 5000 Unit(s) SubCutaneous every 12 hours  HYDROmorphone PCA (1 mG/mL) 30 milliLiter(s) PCA Continuous PCA Continuous  lidocaine   4% Patch 2 Patch Transdermal daily    MEDICATIONS  (PRN):  naloxone Injectable 0.1 milliGRAM(s) IV Push every 3 minutes PRN For ANY of the following changes in patient status:  A. RR LESS THAN 10 breaths per minute, B. Oxygen saturation LESS THAN 90%, C. Sedation score of 6  ondansetron Injectable 4 milliGRAM(s) IV Push every 6 hours PRN Nausea    CAPILLARY BLOOD GLUCOSE        I&O's Summary    07 May 2025 07:01  -  08 May 2025 07:00  --------------------------------------------------------  IN: 1617.5 mL / OUT: 1275 mL / NET: 342.5 mL    08 May 2025 07:01  -  08 May 2025 12:57  --------------------------------------------------------  IN: 120 mL / OUT: 200 mL / NET: -80 mL        PHYSICAL EXAM:  Vital Signs Last 24 Hrs  T(C): 37.3 (08 May 2025 08:52), Max: 37.3 (08 May 2025 04:42)  T(F): 99.1 (08 May 2025 08:52), Max: 99.2 (08 May 2025 04:42)  HR: 76 (08 May 2025 11:49) (57 - 86)  BP: 142/65 (08 May 2025 11:49) (109/60 - 145/80)  BP(mean): 93 (08 May 2025 11:49) (80 - 93)  RR: 17 (08 May 2025 11:49) (12 - 20)  SpO2: 99% (08 May 2025 11:49) (95% - 100%)    Parameters below as of 08 May 2025 08:25  Patient On (Oxygen Delivery Method): room air    Appears slightly uncomfortable due to pain   MMM  Normal WOB on RA, CTAB   RRR, no mrg   Abdomen soft, diffusely tender to palpation but no rebound or guarding   Extremities warm and without edema   AOX3, no focal neuro deficits     LABS:                        10.5   12.23 )-----------( 456      ( 08 May 2025 05:30 )             33.0     05-08    139  |  105  |  12  ----------------------------<  115[H]  4.3   |  23  |  0.48[L]    Ca    9.0      08 May 2025 05:30  Phos  3.6     05-08  Mg     2.3     05-08      PT/INR - ( 08 May 2025 05:30 )   PT: 11.6 sec;   INR: 1.01          PTT - ( 08 May 2025 05:30 )  PTT:29.8 sec      Urinalysis Basic - ( 08 May 2025 05:30 )    Color: x / Appearance: x / SG: x / pH: x  Gluc: 115 mg/dL / Ketone: x  / Bili: x / Urobili: x   Blood: x / Protein: x / Nitrite: x   Leuk Esterase: x / RBC: x / WBC x   Sq Epi: x / Non Sq Epi: x / Bacteria: x            RADIOLOGY & ADDITIONAL TESTS:  Imaging from Last 24 Hours:      None new interval

## 2025-05-08 NOTE — DIETITIAN INITIAL EVALUATION ADULT - PERTINENT MEDS FT
MEDICATIONS  (STANDING):  acetaminophen   IVPB .. 725 milliGRAM(s) IV Intermittent every 6 hours  aspirin  chewable 81 milliGRAM(s) Oral daily  dextrose 5% + sodium chloride 0.45% with potassium chloride 20 mEq/L 1000 milliLiter(s) (60 mL/Hr) IV Continuous <Continuous>  heparin   Injectable 5000 Unit(s) SubCutaneous every 12 hours  HYDROmorphone PCA (1 mG/mL) 30 milliLiter(s) PCA Continuous PCA Continuous  lidocaine   4% Patch 2 Patch Transdermal daily    MEDICATIONS  (PRN):  naloxone Injectable 0.1 milliGRAM(s) IV Push every 3 minutes PRN For ANY of the following changes in patient status:  A. RR LESS THAN 10 breaths per minute, B. Oxygen saturation LESS THAN 90%, C. Sedation score of 6  ondansetron Injectable 4 milliGRAM(s) IV Push every 6 hours PRN Nausea

## 2025-05-08 NOTE — DIETITIAN INITIAL EVALUATION ADULT - PERTINENT LABORATORY DATA
05-08    139  |  105  |  12  ----------------------------<  115[H]  4.3   |  23  |  0.48[L]    Ca    9.0      08 May 2025 05:30  Phos  3.6     05-08  Mg     2.3     05-08

## 2025-05-08 NOTE — DIETITIAN INITIAL EVALUATION ADULT - ADD RECOMMEND
1. NPO  **as medically feasible, advance diet as tolerated to Regular diet, provide nourishments and/or oral nutrition supplements per pt preference  2. Monitor PO intake, diet tolerance, weight trends, labs, and skin integrity and bowel movement regularity.   3. Encourage PO intake and honor food preferences as able unless otherwise contraindicated.   4. RDN will continue to monitor, reassess, and intervene as appropriate.

## 2025-05-09 ENCOUNTER — TRANSCRIPTION ENCOUNTER (OUTPATIENT)
Age: 70
End: 2025-05-09

## 2025-05-09 LAB
ANION GAP SERPL CALC-SCNC: 13 MMOL/L — SIGNIFICANT CHANGE UP (ref 5–17)
APTT BLD: 71.8 SEC — HIGH (ref 26.1–36.8)
APTT BLD: 79.5 SEC — HIGH (ref 26.1–36.8)
BUN SERPL-MCNC: 6 MG/DL — LOW (ref 7–23)
CALCIUM SERPL-MCNC: 9.5 MG/DL — SIGNIFICANT CHANGE UP (ref 8.4–10.5)
CHLORIDE SERPL-SCNC: 102 MMOL/L — SIGNIFICANT CHANGE UP (ref 96–108)
CO2 SERPL-SCNC: 24 MMOL/L — SIGNIFICANT CHANGE UP (ref 22–31)
CREAT SERPL-MCNC: 0.41 MG/DL — LOW (ref 0.5–1.3)
EGFR: 107 ML/MIN/1.73M2 — SIGNIFICANT CHANGE UP
EGFR: 107 ML/MIN/1.73M2 — SIGNIFICANT CHANGE UP
GLUCOSE SERPL-MCNC: 130 MG/DL — HIGH (ref 70–99)
HCT VFR BLD CALC: 31.8 % — LOW (ref 34.5–45)
HGB BLD-MCNC: 10.6 G/DL — LOW (ref 11.5–15.5)
MAGNESIUM SERPL-MCNC: 2.1 MG/DL — SIGNIFICANT CHANGE UP (ref 1.6–2.6)
MCHC RBC-ENTMCNC: 31.6 PG — SIGNIFICANT CHANGE UP (ref 27–34)
MCHC RBC-ENTMCNC: 33.3 G/DL — SIGNIFICANT CHANGE UP (ref 32–36)
MCV RBC AUTO: 94.9 FL — SIGNIFICANT CHANGE UP (ref 80–100)
NRBC BLD AUTO-RTO: 0 /100 WBCS — SIGNIFICANT CHANGE UP (ref 0–0)
PHOSPHATE SERPL-MCNC: 1.7 MG/DL — LOW (ref 2.5–4.5)
PLATELET # BLD AUTO: 563 K/UL — HIGH (ref 150–400)
POTASSIUM SERPL-MCNC: 3.9 MMOL/L — SIGNIFICANT CHANGE UP (ref 3.5–5.3)
POTASSIUM SERPL-SCNC: 3.9 MMOL/L — SIGNIFICANT CHANGE UP (ref 3.5–5.3)
RBC # BLD: 3.35 M/UL — LOW (ref 3.8–5.2)
RBC # FLD: 13.1 % — SIGNIFICANT CHANGE UP (ref 10.3–14.5)
SODIUM SERPL-SCNC: 139 MMOL/L — SIGNIFICANT CHANGE UP (ref 135–145)
WBC # BLD: 10.57 K/UL — HIGH (ref 3.8–10.5)
WBC # FLD AUTO: 10.57 K/UL — HIGH (ref 3.8–10.5)

## 2025-05-09 PROCEDURE — 99232 SBSQ HOSP IP/OBS MODERATE 35: CPT

## 2025-05-09 PROCEDURE — 74018 RADEX ABDOMEN 1 VIEW: CPT | Mod: 26

## 2025-05-09 RX ORDER — APIXABAN 2.5 MG/1
5 TABLET, FILM COATED ORAL EVERY 12 HOURS
Refills: 0 | Status: DISCONTINUED | OUTPATIENT
Start: 2025-05-09 | End: 2025-05-12

## 2025-05-09 RX ORDER — ACETAMINOPHEN 500 MG/5ML
750 LIQUID (ML) ORAL EVERY 8 HOURS
Refills: 0 | Status: COMPLETED | OUTPATIENT
Start: 2025-05-09 | End: 2025-05-10

## 2025-05-09 RX ORDER — APIXABAN 2.5 MG/1
5 TABLET, FILM COATED ORAL EVERY 12 HOURS
Refills: 0 | Status: DISCONTINUED | OUTPATIENT
Start: 2025-05-09 | End: 2025-05-09

## 2025-05-09 RX ORDER — HEPARIN SODIUM 1000 [USP'U]/ML
1000 INJECTION INTRAVENOUS; SUBCUTANEOUS
Qty: 25000 | Refills: 0 | Status: DISCONTINUED | OUTPATIENT
Start: 2025-05-09 | End: 2025-05-09

## 2025-05-09 RX ORDER — SODIUM PHOSPHATE,DIBASIC DIHYD
30 POWDER (GRAM) MISCELLANEOUS ONCE
Refills: 0 | Status: COMPLETED | OUTPATIENT
Start: 2025-05-09 | End: 2025-05-09

## 2025-05-09 RX ORDER — ACETAMINOPHEN 500 MG/5ML
725 LIQUID (ML) ORAL ONCE
Refills: 0 | Status: COMPLETED | OUTPATIENT
Start: 2025-05-09 | End: 2025-05-09

## 2025-05-09 RX ADMIN — SCOPOLAMINE 1 PATCH: 1 PATCH, EXTENDED RELEASE TRANSDERMAL at 06:00

## 2025-05-09 RX ADMIN — Medication 290 MILLIGRAM(S): at 05:00

## 2025-05-09 RX ADMIN — APIXABAN 5 MILLIGRAM(S): 2.5 TABLET, FILM COATED ORAL at 15:46

## 2025-05-09 RX ADMIN — HEPARIN SODIUM 10 UNIT(S)/HR: 1000 INJECTION INTRAVENOUS; SUBCUTANEOUS at 00:17

## 2025-05-09 RX ADMIN — Medication 300 MILLIGRAM(S): at 19:06

## 2025-05-09 RX ADMIN — Medication 725 MILLIGRAM(S): at 05:30

## 2025-05-09 RX ADMIN — Medication 300 MILLIGRAM(S): at 11:09

## 2025-05-09 RX ADMIN — LIDOCAINE HYDROCHLORIDE 2 PATCH: 20 JELLY TOPICAL at 12:25

## 2025-05-09 RX ADMIN — Medication 81 MILLIGRAM(S): at 12:24

## 2025-05-09 RX ADMIN — Medication 40 MILLIGRAM(S): at 12:24

## 2025-05-09 RX ADMIN — Medication 750 MILLIGRAM(S): at 13:25

## 2025-05-09 RX ADMIN — Medication 85 MILLIMOLE(S): at 09:25

## 2025-05-09 NOTE — PHYSICAL THERAPY INITIAL EVALUATION ADULT - GAIT DEVIATIONS NOTED, PT EVAL
slow but generally steady gait with use of RW, no overt LOB or knee buckling observed/decreased janiya/increased time in double stance/decreased step length/decreased weight-shifting ability

## 2025-05-09 NOTE — DISCHARGE NOTE PROVIDER - NSDCCPCAREPLAN_GEN_ALL_CORE_FT
PRINCIPAL DISCHARGE DIAGNOSIS  Diagnosis: Chronic mesenteric ischemia  Assessment and Plan of Treatment:       SECONDARY DISCHARGE DIAGNOSES  Diagnosis: Breast cancer  Assessment and Plan of Treatment:     Diagnosis: Osteoporosis  Assessment and Plan of Treatment:     Diagnosis: S/P appendectomy  Assessment and Plan of Treatment:     Diagnosis: Leukocytosis  Assessment and Plan of Treatment:     Diagnosis: Macrocytic anemia  Assessment and Plan of Treatment:      PRINCIPAL DISCHARGE DIAGNOSIS  Diagnosis: Chronic mesenteric ischemia  Assessment and Plan of Treatment:       SECONDARY DISCHARGE DIAGNOSES  Diagnosis: Breast cancer  Assessment and Plan of Treatment:     Diagnosis: Osteoporosis  Assessment and Plan of Treatment:     Diagnosis: S/P appendectomy  Assessment and Plan of Treatment:     Diagnosis: Leukocytosis  Assessment and Plan of Treatment:     Diagnosis: Macrocytic anemia  Assessment and Plan of Treatment:     Diagnosis: Severe protein-calorie malnutrition  Assessment and Plan of Treatment:     Diagnosis: Underweight  Assessment and Plan of Treatment:

## 2025-05-09 NOTE — DISCHARGE NOTE PROVIDER - NSDCFUADDINST_GEN_ALL_CORE_FT
FOLLOW UP: Dr. Bloom in 1 week. Your appointment has been made for _______. Call the office at  with any questions.     WOUND CARE: You may shower; soap and water over incision sites. Do not scrub. Pat dry when done.     ACTIVITY: Ambulate as tolerated, but no heavy lifting (>10lbs) or strenuous exercise.    DIET: You may resume regular diet. Call the office if you experience increasing pain, redness, swelling or drainage from incision sites/wounds, or temperature >101.4F.     NEW MEDICATIONS:    ADDITIONAL FOLLOW UP AFTER DISCHARGE:  Follow up with your PCP per routine.    DISCHARGE DESTINATION:     Discharge Education provided:  FOLLOW UP: Dr. Bloom in 1 week. Your appointment has been made for 5/27/25 at 9am. Call the office at  with any questions.     WOUND CARE: You may shower; soap and water over incision sites. Do not scrub. Pat dry when done.     ACTIVITY: Ambulate as tolerated, but no heavy lifting (>10lbs) or strenuous exercise.    DIET: You may resume regular diet. Call the office if you experience increasing pain, redness, swelling or drainage from incision sites/wounds, or temperature >101.4F.     NEW MEDICATIONS: None, please continue all home medications.    ADDITIONAL FOLLOW UP AFTER DISCHARGE:  Follow up with your PCP per routine.    DISCHARGE DESTINATION: Home    Discharge Education provided: Yes FOLLOW UP: Dr. Bloom in 1 week. Your appointment has been made for 5/27/25 at 9am. Call the office at  with any questions.     WOUND CARE: You may shower; soap and water over incision sites. Do not scrub. Pat dry when done.     ACTIVITY: Ambulate as tolerated, but no heavy lifting (>10lbs) or strenuous exercise.    DIET: You may resume regular diet. Call the office if you experience increasing pain, redness, swelling or drainage from incision sites/wounds, or temperature >101.4F.     NEW MEDICATIONS: Eliquis 5mg every 12 hours. Please  the new medication at [facility] located in the lobby of the hospital.     ADDITIONAL FOLLOW UP AFTER DISCHARGE:  Follow up with your PCP per routine.    DISCHARGE DESTINATION: Home    Discharge Education provided: Yes

## 2025-05-09 NOTE — DISCHARGE NOTE PROVIDER - NSDCMRMEDTOKEN_GEN_ALL_CORE_FT
anastrozole 1 mg oral tablet: 1 tab(s) orally once a day  aspirin 81 mg oral capsule: 1 cap(s) orally once a day  atorvastatin 10 mg oral tablet: 1 tab(s) orally once a day  omeprazole 40 mg oral delayed release capsule: 1 cap(s) orally once a day   anastrozole 1 mg oral tablet: 1 tab(s) orally once a day  apixaban 5 mg oral tablet: 1 tab(s) orally every 12 hours  aspirin 81 mg oral capsule: 1 cap(s) orally once a day  atorvastatin 10 mg oral tablet: 1 tab(s) orally once a day  omeprazole 40 mg oral delayed release capsule: 1 cap(s) orally once a day

## 2025-05-09 NOTE — PROGRESS NOTE ADULT - SUBJECTIVE AND OBJECTIVE BOX
Medicine Progress Note    Patient is a 69y old  Female who presents with a chief complaint of K55.1     (08 May 2025 14:21)      SUBJECTIVE / OVERNIGHT EVENTS:  Feels about the same today, has ongoing mild-moderate pain in the abdominal area with improvement with PRN pain medications. Able to get up out of bed into bedside chair. No new symptoms.     MEDICATIONS  (STANDING):  acetaminophen   IVPB .. 750 milliGRAM(s) IV Intermittent every 8 hours  apixaban 5 milliGRAM(s) Oral every 12 hours  aspirin  chewable 81 milliGRAM(s) Oral daily  dextrose 5% + sodium chloride 0.45% with potassium chloride 20 mEq/L 1000 milliLiter(s) (60 mL/Hr) IV Continuous <Continuous>  lidocaine   4% Patch 2 Patch Transdermal daily  pantoprazole  Injectable 40 milliGRAM(s) IV Push daily  scopolamine 1 mG/72 Hr(s) Patch 1 Patch Transdermal every 72 hours    MEDICATIONS  (PRN):  naloxone Injectable 0.1 milliGRAM(s) IV Push every 3 minutes PRN For ANY of the following changes in patient status:  A. RR LESS THAN 10 breaths per minute, B. Oxygen saturation LESS THAN 90%, C. Sedation score of 6  ondansetron Injectable 4 milliGRAM(s) IV Push every 6 hours PRN Nausea and/or Vomiting    CAPILLARY BLOOD GLUCOSE        I&O's Summary    08 May 2025 07:01  -  09 May 2025 07:00  --------------------------------------------------------  IN: 2764 mL / OUT: 2800 mL / NET: -36 mL    09 May 2025 07:01  -  09 May 2025 15:33  --------------------------------------------------------  IN: 60 mL / OUT: 0 mL / NET: 60 mL        PHYSICAL EXAM:  Vital Signs Last 24 Hrs  T(C): 36.8 (09 May 2025 13:23), Max: 37.4 (08 May 2025 18:13)  T(F): 98.2 (09 May 2025 13:23), Max: 99.4 (08 May 2025 18:13)  HR: 90 (09 May 2025 12:10) (74 - 90)  BP: 136/77 (09 May 2025 12:10) (131/56 - 159/71)  BP(mean): 101 (09 May 2025 12:10) (81 - 102)  RR: 16 (09 May 2025 12:10) (15 - 18)  SpO2: 99% (09 May 2025 12:10) (98% - 99%)    Parameters below as of 09 May 2025 12:10  Patient On (Oxygen Delivery Method): room air    Appears slightly uncomfortable due to pain   MMM  Normal WOB on RA, CTAB   RRR, no mrg   Abdomen soft, diffusely tender to palpation but no rebound or guarding   Extremities warm and without edema   AOX3, no focal neuro deficits     LABS:                        10.6   10.57 )-----------( 563      ( 09 May 2025 06:44 )             31.8     05-09    139  |  102  |  6[L]  ----------------------------<  130[H]  3.9   |  24  |  0.41[L]    Ca    9.5      09 May 2025 06:44  Phos  1.7     05-09  Mg     2.1     05-09      PT/INR - ( 08 May 2025 05:30 )   PT: 11.6 sec;   INR: 1.01          PTT - ( 09 May 2025 12:00 )  PTT:79.5 sec      Urinalysis Basic - ( 09 May 2025 06:44 )    Color: x / Appearance: x / SG: x / pH: x  Gluc: 130 mg/dL / Ketone: x  / Bili: x / Urobili: x   Blood: x / Protein: x / Nitrite: x   Leuk Esterase: x / RBC: x / WBC x   Sq Epi: x / Non Sq Epi: x / Bacteria: x            RADIOLOGY & ADDITIONAL TESTS:  Imaging from Last 24 Hours:    None new interval

## 2025-05-09 NOTE — OCCUPATIONAL THERAPY INITIAL EVALUATION ADULT - PERTINENT HX OF CURRENT PROBLEM, REHAB EVAL
70yo F with pmhx of Breast cancer (s/p lumpectomy, on Anastazole), osteoporosis, appendicitis (s/p lap appendectomy 4/5/25), recently diagnosed chronic mesenteric ischemia d/t severe SMA stenosis. Patient complaining of abdominal pain, weight loss, and food aversion since January of this year. Underwent mesenteric angio w/ SMA stent placement 4/14/25, had improvement in symptoms for few days and then symptoms returned, found to have occlusion of SMA stent on duplex, confirmed on dx mesenteric angio (5/2/25). Patient returns for aortic-mesenteric artery bypass. Is now s/p infrarenal AA bypass to SMA bypass with 8mm Hemashield graft.

## 2025-05-09 NOTE — PHYSICAL THERAPY INITIAL EVALUATION ADULT - GENERAL OBSERVATIONS, REHAB EVAL
Pt received semi supine in bed in NAD, +tele, +pulse ox, +HEP lock, +b/l SCDs, +OT Lexa colindres, YENI Sarmiento and T3 JUAN ALBERTO Lieberman cleared Pt for PT treatment session. Pt is AO x 4 and is agreeable and motivated to participate in PT session.

## 2025-05-09 NOTE — DISCHARGE NOTE PROVIDER - CARE PROVIDER_API CALL
Yan Bloom  Vascular Surgery  [address]  [place] [address]  Phone: [phone]  Fax: [phone]  Follow Up Time: 1 week   Yan Bloom  Vascular Surgery  [address]  [place] [address]  Phone: [phone]  Fax: [phone]  Scheduled Appointment: 05/27/2025 09:00 AM

## 2025-05-09 NOTE — OCCUPATIONAL THERAPY INITIAL EVALUATION ADULT - GENERAL OBSERVATIONS, REHAB EVAL
Pt received semi-supine in bed, +tele, +IV, in NAD and agreeable to OT. Cleared by YENI Sarmiento to see.

## 2025-05-09 NOTE — DISCHARGE NOTE PROVIDER - PROVIDER TOKENS
Pt states he cut his left 4th finger with a steak knife last night around 2000. PROVIDER:[TOKEN:[[id number]],FOLLOWUP:[1 week]] PROVIDER:[TOKEN:[[id number]],SCHEDULEDAPPT:[05/27/2025],SCHEDULEDAPPTTIME:[09:00 AM]]

## 2025-05-09 NOTE — PROGRESS NOTE ADULT - SUBJECTIVE AND OBJECTIVE BOX
O/N; CHARANJIT, VSS    Subjective: Patient says that pain is stable but persistent, nausea overall improved but waxes and wanes, -F/-BM.    ROS:   Denies Headache, blurred vision, Chest Pain, SOB, Abdominal pain, nausea or vomiting     Social   aspirin  chewable 81  heparin  Infusion 1000      Allergies    erythromycin (Hives)  denosumab (Unknown)  Prolia (Other)  Augmentin (Unknown)  azithromycin (Hives)  vancomycin (Hives)    Intolerances        Vital Signs Last 24 Hrs  T(C): 36.8 (09 May 2025 04:49), Max: 37.4 (08 May 2025 18:13)  T(F): 98.3 (09 May 2025 04:49), Max: 99.4 (08 May 2025 18:13)  HR: 78 (09 May 2025 03:47) (68 - 82)  BP: 149/66 (09 May 2025 03:47) (126/58 - 149/66)  BP(mean): 95 (09 May 2025 03:47) (81 - 95)  RR: 17 (09 May 2025 03:47) (15 - 18)  SpO2: 98% (09 May 2025 03:47) (97% - 99%)    Parameters below as of 09 May 2025 03:47  Patient On (Oxygen Delivery Method): room air      I&O's Summary    07 May 2025 07:01  -  08 May 2025 07:00  --------------------------------------------------------  IN: 1617.5 mL / OUT: 1275 mL / NET: 342.5 mL    08 May 2025 07:01  -  09 May 2025 06:55  --------------------------------------------------------  IN: 2764 mL / OUT: 2800 mL / NET: -36 mL        PHYSICAL EXAM:  General: NAD, pt resting comfortably in bed  Pulm: No respiratory distress, nonlabored breathing, on room air  CVS: NSR, HDS  Abd: Soft, TTP surrounding midline incision which is c/d/i, ND  Extremities: WWP, no edema    LABS:                        10.7   13.24 )-----------( 472      ( 08 May 2025 14:00 )             32.3     05-08    139  |  105  |  12  ----------------------------<  115[H]  4.3   |  23  |  0.48[L]    Ca    9.0      08 May 2025 05:30  Phos  3.6     05-08  Mg     2.3     05-08      PT/INR - ( 08 May 2025 05:30 )   PT: 11.6 sec;   INR: 1.01          PTT - ( 08 May 2025 22:51 )  PTT:52.7 sec      ---------------------------------------------------------------------------  PLEASE CHECK WHEN PRESENT:  [  ]Heart Failure     [  ] Acute       [  ] Acute on Chronic       [  ] Chronic       [  ] Diastolic [HFpEF]       [  ] Systolic [HFrEF]       [  ] Combined [HFpEF & HFrEF]  ---------------------------------------------------------------------------  [  ] Hypertensive Heart Disease  [  ] CAD  [  ] Type 2 MI  [  ] Atrial Fibrillation     [  ] Paroxysmal A-fib     [  ] Chronic A-fib     [  ] Persistent A-fib     [  ] Longstanding Persistent A-fib     [  ] Permanent A-fib  [  ] Pulmonary Hypertension  -------------------------------------------------------------------  [  ] Acute Respiratory Failure with  [  ] Hypoxia  [  ] Hypercapnia  [  ] Asthma with Acute Exacerbation  [  ] COPD with Acute Exacerbation  [  ] COPD on home O2 (Chronic Respiratory Failure)  [  ] Atelectasis  [  ] Pleural Effusion  [  ] Acute Pulmonary Embolism  [  ] Acute Cor Pulmonale  [  ] Obstructive Sleep Apnea  -------------------------------------------------------------------  [  ] Acute Kidney Injury      [  ] Acute Tubular Necrosis   [  ] Chronic Kidney Disease     [  ] CKD 1  [  ] CKD 2  [  ] CKD 3     [  ] CKD 4  [  ] CKD 5 (ESRD)  [  ] Fluid Overload  [  ] Acid/Base Disorder     [  ] Acidosis       [  ] Alkalosis  [  ] Electrolyte Abnormalities     [  ] Hyponatremia  [  ] Hypernatremia     [  ] Hypokalemia  [  ] Hyperkalemia     [  ] Hypomagnesemia     [  ] Hypophosphatemia  [  ] Hyperphosphatemia  -------------------------------------------------------------------  [  ] Diabetes  [  ] Type 1  [  ] Type 2  [  ] Diabetes with  [  ] Hypoglycemia  [  ] Hyperglycemia  [  ] Diabetes with Neuropathy  [  ] Neuropathic Ulcer due to Diabetes  [  ] Diabetes with Peripheral Angiopathy  [  ] Diabetic Ischemic Ulcer  [  ] Diabetic Foot Infection  [  ] Osteomyelitis due to Diabetes  --------------------------------------------------------------------  [  ] SIRS     [  ] Sepsis  [  ] Severe Sepsis  [  ] Septic Shock     [  ] Noninfectious SIRS  [  ] Pneumonia     [  ] Hospital Acquired Pneumonia     [  ] Aspiration Pneumonia     [  ] Pneumonia due to:  [  ]  UTI  ------------------------------------------------------------------------  [  ] Acute blood loss anemia  [  ] Post op blood loss anemia  [  ] Iron deficiency anemia  [  ] Folate deficiency anemia  [  ] B12 deficiency anemia  [  ] Anemia due to chronic disease  [  ] Thrombocytopenia  [  ] Hypercoagulable state  [  ] Acute DVT  ----------------------------------------------------------------------  [  ] Cerebrovascular Accident  [  ] Transient Ischemic Attack  [  ] Encephalopathy     [  ] Metabolic  [  ] Toxic  [  ] Toxic-Metabolic     [  ] Septic  [  ] Uremic  [  ] Hepatic  [  ] Hypertensive  ----------------------------------------------------------------------  [  ] Malnutrition: See Nutrition Note     [  ] Supplement Ordered:  [  ] Morbid Obesity (BMI >=40)  [  ] Ileus  ---------------------------------------------------------------------  [  ] Smoker  [  ] Former  [  ] Current  [  ] Functional Quadriplegia  [  ] Moderate Depression  [  ] Homelessness  [  ] Sheltered   [  ] Unsheltered       A/P: 68yo F with pmhx of Breast cancer (s/p lumpectomy, on Anastazole), osteoporosis, appendicitis (s/p lap appendectomy 4/5/25), recently diagnosed chronic mesenteric ischemia d/t severe SMA stenosis. Patient complaining of abdominal pain, weight loss, and food aversion since January of this year. Underwent mesenteric angio w/ SMA stent placement 4/14/25, had improvement in symptoms for few days and then symptoms returned, found to have occlusion of SMA stent on duplex, confirmed on dx mesenteric angio (5/2/25). Patient returns for aortic-mesenteric artery bypass. Is now s/p infrarenal AA bypass to SMA bypass with 8mm Hemashield graft.    #Chronic Mesenteric Ischemia  - s/p infrarenal abdominal aorta to SMA bypass 5/7  - Serial abdominal exams   - c/w ASA and statin  - c/w hep gtt  - nausea and pain control  - bowel regimen    #Breast Cancer s/p lumpectomy  - on anastrazole at home, f/u when to resume- onc says that patient should be on it and that it is unlikely the cause of her possible hypercoagulable state    Activity: as marlen  Diet: NPO + IVF  DVTppx: hep gtt  Dispo: 8lach

## 2025-05-09 NOTE — DISCHARGE NOTE PROVIDER - DETAILS OF MALNUTRITION DIAGNOSIS/DIAGNOSES
This patient has been assessed with a concern for Malnutrition and was treated during this hospitalization for the following Nutrition diagnosis/diagnoses:     -  05/08/2025: Severe protein-calorie malnutrition   -  05/08/2025: Underweight (BMI < 19)

## 2025-05-09 NOTE — OCCUPATIONAL THERAPY INITIAL EVALUATION ADULT - DIAGNOSIS, OT EVAL
Pt is s/p infrarenal abdominal aorta to SMA bypass 5/7 presents with generalized deconditioning and pain, and decreased activity tolerance impacting overall ease of completing ADLs and functional mobility tasks at [facility].

## 2025-05-09 NOTE — DISCHARGE NOTE PROVIDER - NSDCCAREPROVSEEN_GEN_ALL_CORE_FT
NOTIFICATION RETURN TO WORK / SCHOOL 
 
7/24/2019 8:44 AM 
 
Mr. Janes Bean [facility] [phone] To Whom It May Concern: 
 
Janes Bean is currently under the care of [address] [place] [address] Francisco Jean. 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 If there are questions or concerns please have the patient contact our office.  
 
 
 
Sincerely, 
 
 
Rico Briscoe MD 
 
                                
 
 Yan Bloom

## 2025-05-09 NOTE — DISCHARGE NOTE PROVIDER - CARE PROVIDERS DIRECT ADDRESSES
,[email or web address]

## 2025-05-09 NOTE — DISCHARGE NOTE PROVIDER - HOSPITAL COURSE
70yo F with pmhx of Breast cancer (s/p lumpectomy, on Anastazole), osteoporosis, appendicitis (s/p lap appendectomy 4/5/25), recently diagnosed chronic mesenteric ischemia d/t severe SMA stenosis. Patient complaining of abdominal pain, weight loss, and food aversion since January of this year. Underwent mesenteric angio w/ SMA stent placement 4/14/25, had improvement in symptoms for few days and then symptoms returned, found to have occlusion of SMA stent on duplex, confirmed on dx mesenteric angio (5/2/25). Patient now s/p infrarenal aorta to SMA bypass on 5/7. On POD1 patient was started on a heparin gtt.    **incomplete 5/9**     68yo F with pmhx of Breast cancer (s/p lumpectomy, on Anastazole), osteoporosis, appendicitis (s/p lap appendectomy 4/5/25), recently diagnosed chronic mesenteric ischemia d/t severe SMA stenosis. Patient complaining of abdominal pain, weight loss, and food aversion since January of this year. Underwent mesenteric angio w/ SMA stent placement 4/14/25, had improvement in symptoms for few days and then symptoms returned, found to have occlusion of SMA stent on duplex, confirmed on dx mesenteric angio (5/2/25). Patient now s/p infrarenal aorta to SMA bypass 5/27. Post-operatively patient was started on a hep gtt and was ultimately transitioned to PO eliquis which she will continue on discharge. Patient had return of bowel function on 5/12 and had multiple BM while tolerating PO diet.     Today patient is feeling well, all the VS and blood work is within normal limits, the pain is well controlled on oral pain medication, tolerating diet without nausea, and ambulating independently - patient is stable and ready for discharge today.

## 2025-05-09 NOTE — OCCUPATIONAL THERAPY INITIAL EVALUATION ADULT - ADDITIONAL COMMENTS
Pt lives alone in an apartment, no HENRI has elevator access. Prior to admit, pt reports being independent with all ADLs/IADLs and mobility, denies use of AD or DME. Pt has a tub/shower.

## 2025-05-09 NOTE — PHYSICAL THERAPY INITIAL EVALUATION ADULT - ADDITIONAL COMMENTS
Pt lives alone in an apartment with no HENRI; has elevator access. Prior to admit, pt reports being independent with all ADLs/IADLs and mobility, denies use of AD or DME. Pt has a tub/shower.

## 2025-05-09 NOTE — OCCUPATIONAL THERAPY INITIAL EVALUATION ADULT - MODIFIED CLINICAL TEST OF SENSORY INTEGRATION IN BALANCE TEST
Pt able to ambulate in hallway ~75'x2 with CGA-SBA using no AD, no LOB noted and good safety awareness throughout

## 2025-05-10 LAB
ANION GAP SERPL CALC-SCNC: 11 MMOL/L — SIGNIFICANT CHANGE UP (ref 5–17)
BUN SERPL-MCNC: 7 MG/DL — SIGNIFICANT CHANGE UP (ref 7–23)
BUN SERPL-MCNC: SIGNIFICANT CHANGE UP MG/DL (ref 7–23)
CALCIUM SERPL-MCNC: 9.3 MG/DL — SIGNIFICANT CHANGE UP (ref 8.4–10.5)
CALCIUM SERPL-MCNC: SIGNIFICANT CHANGE UP MG/DL (ref 8.4–10.5)
CHLORIDE SERPL-SCNC: 102 MMOL/L — SIGNIFICANT CHANGE UP (ref 96–108)
CHLORIDE SERPL-SCNC: 104 MMOL/L — SIGNIFICANT CHANGE UP (ref 96–108)
CO2 SERPL-SCNC: 23 MMOL/L — SIGNIFICANT CHANGE UP (ref 22–31)
CO2 SERPL-SCNC: 27 MMOL/L — SIGNIFICANT CHANGE UP (ref 22–31)
CREAT SERPL-MCNC: 0.39 MG/DL — LOW (ref 0.5–1.3)
CREAT SERPL-MCNC: SIGNIFICANT CHANGE UP MG/DL (ref 0.5–1.3)
EGFR: 108 ML/MIN/1.73M2 — SIGNIFICANT CHANGE UP
EGFR: 108 ML/MIN/1.73M2 — SIGNIFICANT CHANGE UP
GLUCOSE SERPL-MCNC: 135 MG/DL — HIGH (ref 70–99)
GLUCOSE SERPL-MCNC: SIGNIFICANT CHANGE UP MG/DL (ref 70–99)
HCT VFR BLD CALC: 29 % — LOW (ref 34.5–45)
HGB BLD-MCNC: 9.4 G/DL — LOW (ref 11.5–15.5)
MAGNESIUM SERPL-MCNC: 1.7 MG/DL — SIGNIFICANT CHANGE UP (ref 1.6–2.6)
MAGNESIUM SERPL-MCNC: 2.1 MG/DL — SIGNIFICANT CHANGE UP (ref 1.6–2.6)
MCHC RBC-ENTMCNC: 31.2 PG — SIGNIFICANT CHANGE UP (ref 27–34)
MCHC RBC-ENTMCNC: 32.4 G/DL — SIGNIFICANT CHANGE UP (ref 32–36)
MCV RBC AUTO: 96.3 FL — SIGNIFICANT CHANGE UP (ref 80–100)
NRBC BLD AUTO-RTO: 0 /100 WBCS — SIGNIFICANT CHANGE UP (ref 0–0)
PHOSPHATE SERPL-MCNC: 2.4 MG/DL — LOW (ref 2.5–4.5)
PHOSPHATE SERPL-MCNC: SIGNIFICANT CHANGE UP MG/DL (ref 2.5–4.5)
PLATELET # BLD AUTO: 473 K/UL — HIGH (ref 150–400)
POTASSIUM SERPL-MCNC: 3.8 MMOL/L — SIGNIFICANT CHANGE UP (ref 3.5–5.3)
POTASSIUM SERPL-MCNC: SIGNIFICANT CHANGE UP MMOL/L (ref 3.5–5.3)
POTASSIUM SERPL-SCNC: 3.8 MMOL/L — SIGNIFICANT CHANGE UP (ref 3.5–5.3)
POTASSIUM SERPL-SCNC: SIGNIFICANT CHANGE UP MMOL/L (ref 3.5–5.3)
RBC # BLD: 3.01 M/UL — LOW (ref 3.8–5.2)
RBC # FLD: 13 % — SIGNIFICANT CHANGE UP (ref 10.3–14.5)
SODIUM SERPL-SCNC: 142 MMOL/L — SIGNIFICANT CHANGE UP (ref 135–145)
SODIUM SERPL-SCNC: SIGNIFICANT CHANGE UP MMOL/L (ref 135–145)
WBC # BLD: 6.92 K/UL — SIGNIFICANT CHANGE UP (ref 3.8–10.5)
WBC # FLD AUTO: 6.92 K/UL — SIGNIFICANT CHANGE UP (ref 3.8–10.5)

## 2025-05-10 PROCEDURE — 99233 SBSQ HOSP IP/OBS HIGH 50: CPT

## 2025-05-10 RX ORDER — SODIUM CHLORIDE 9 G/1000ML
1000 INJECTION, SOLUTION INTRAVENOUS
Refills: 0 | Status: DISCONTINUED | OUTPATIENT
Start: 2025-05-10 | End: 2025-05-11

## 2025-05-10 RX ORDER — POTASSIUM PHOSPHATE, MONOBASIC POTASSIUM PHOSPHATE, DIBASIC INJECTION, 236; 224 MG/ML; MG/ML
15 SOLUTION, CONCENTRATE INTRAVENOUS ONCE
Refills: 0 | Status: COMPLETED | OUTPATIENT
Start: 2025-05-10 | End: 2025-05-10

## 2025-05-10 RX ORDER — ANASTROZOLE 1 MG/1
1 TABLET ORAL DAILY
Refills: 0 | Status: DISCONTINUED | OUTPATIENT
Start: 2025-05-10 | End: 2025-05-12

## 2025-05-10 RX ORDER — SODIUM CHLORIDE 9 G/1000ML
1000 INJECTION, SOLUTION INTRAVENOUS
Refills: 0 | Status: DISCONTINUED | OUTPATIENT
Start: 2025-05-10 | End: 2025-05-10

## 2025-05-10 RX ORDER — ACETAMINOPHEN 500 MG/5ML
750 LIQUID (ML) ORAL EVERY 6 HOURS
Refills: 0 | Status: COMPLETED | OUTPATIENT
Start: 2025-05-10 | End: 2025-05-11

## 2025-05-10 RX ADMIN — Medication 300 MILLIGRAM(S): at 02:00

## 2025-05-10 RX ADMIN — SCOPOLAMINE 1 PATCH: 1 PATCH, EXTENDED RELEASE TRANSDERMAL at 19:23

## 2025-05-10 RX ADMIN — APIXABAN 5 MILLIGRAM(S): 2.5 TABLET, FILM COATED ORAL at 03:33

## 2025-05-10 RX ADMIN — Medication 750 MILLIGRAM(S): at 10:08

## 2025-05-10 RX ADMIN — ANASTROZOLE 1 MILLIGRAM(S): 1 TABLET ORAL at 12:30

## 2025-05-10 RX ADMIN — LIDOCAINE HYDROCHLORIDE 2 PATCH: 20 JELLY TOPICAL at 00:02

## 2025-05-10 RX ADMIN — Medication 300 MILLIGRAM(S): at 09:00

## 2025-05-10 RX ADMIN — APIXABAN 5 MILLIGRAM(S): 2.5 TABLET, FILM COATED ORAL at 13:40

## 2025-05-10 RX ADMIN — Medication 750 MILLIGRAM(S): at 19:23

## 2025-05-10 RX ADMIN — Medication 750 MILLIGRAM(S): at 02:30

## 2025-05-10 RX ADMIN — Medication 50 MILLIEQUIVALENT(S): at 13:39

## 2025-05-10 RX ADMIN — LIDOCAINE HYDROCHLORIDE 2 PATCH: 20 JELLY TOPICAL at 19:23

## 2025-05-10 RX ADMIN — POTASSIUM PHOSPHATE, MONOBASIC POTASSIUM PHOSPHATE, DIBASIC INJECTION, 62.5 MILLIMOLE(S): 236; 224 SOLUTION, CONCENTRATE INTRAVENOUS at 13:01

## 2025-05-10 RX ADMIN — SCOPOLAMINE 1 PATCH: 1 PATCH, EXTENDED RELEASE TRANSDERMAL at 06:14

## 2025-05-10 RX ADMIN — Medication 40 MILLIGRAM(S): at 12:27

## 2025-05-10 RX ADMIN — Medication 750 MILLIGRAM(S): at 09:30

## 2025-05-10 RX ADMIN — Medication 300 MILLIGRAM(S): at 18:52

## 2025-05-10 RX ADMIN — SODIUM CHLORIDE 60 MILLILITER(S): 9 INJECTION, SOLUTION INTRAVENOUS at 12:27

## 2025-05-10 RX ADMIN — Medication 81 MILLIGRAM(S): at 12:28

## 2025-05-10 RX ADMIN — SODIUM CHLORIDE 60 MILLILITER(S): 9 INJECTION, SOLUTION INTRAVENOUS at 23:01

## 2025-05-10 RX ADMIN — LIDOCAINE HYDROCHLORIDE 2 PATCH: 20 JELLY TOPICAL at 12:28

## 2025-05-10 NOTE — PROGRESS NOTE ADULT - SUBJECTIVE AND OBJECTIVE BOX
OVERNIGHT EVENTS: NAEO    SUBJECTIVE / INTERVAL HPI: Patient seen and examined at bedside. Patient denying chest pain, SOB, palpitations, cough. Patient denies fever, chills, HA, Dizziness, N/V.     Remaining ROS negative       PHYSICAL EXAM:    General:NAD.   Appears slightly uncomfortable due to pain   MMM  Normal WOB on RA, CTAB   RRR, no mrg   Abdomen soft, diffusely tender to palpation but no rebound or guarding   Extremities warm and without edema   AOX3, no focal neuro deficits       VITAL SIGNS:  Vital Signs Last 24 Hrs  T(C): 36.8 (10 May 2025 09:06), Max: 37.2 (10 May 2025 04:38)  T(F): 98.3 (10 May 2025 09:06), Max: 99 (10 May 2025 04:38)  HR: 82 (10 May 2025 08:44) (76 - 88)  BP: 156/74 (10 May 2025 08:44) (132/89 - 156/74)  BP(mean): 105 (10 May 2025 08:44) (96 - 105)  RR: 17 (10 May 2025 08:44) (16 - 18)  SpO2: 100% (10 May 2025 08:44) (96% - 100%)    Parameters below as of 10 May 2025 08:44  Patient On (Oxygen Delivery Method): room air          MEDICATIONS:  MEDICATIONS  (STANDING):  anastrozole 1 milliGRAM(s) Oral daily  apixaban 5 milliGRAM(s) Oral every 12 hours  aspirin  chewable 81 milliGRAM(s) Oral daily  dextrose 5% + sodium chloride 0.45%. 1000 milliLiter(s) (60 mL/Hr) IV Continuous <Continuous>  lidocaine   4% Patch 2 Patch Transdermal daily  pantoprazole  Injectable 40 milliGRAM(s) IV Push daily  scopolamine 1 mG/72 Hr(s) Patch 1 Patch Transdermal every 72 hours    MEDICATIONS  (PRN):  naloxone Injectable 0.1 milliGRAM(s) IV Push every 3 minutes PRN For ANY of the following changes in patient status:  A. RR LESS THAN 10 breaths per minute, B. Oxygen saturation LESS THAN 90%, C. Sedation score of 6  ondansetron Injectable 4 milliGRAM(s) IV Push every 6 hours PRN Nausea and/or Vomiting      ALLERGIES:  Allergies    erythromycin (Hives)  denosumab (Unknown)  Prolia (Other)  Augmentin (Unknown)  azithromycin (Hives)  amoxicillin-clavulanate (Unknown)  vancomycin (Hives)    Intolerances        LABS:                        9.4    6.92  )-----------( 473      ( 10 May 2025 07:01 )             29.0     05-10    142  |  104  |  7   ----------------------------<  135[H]  3.8   |  27  |  0.39[L]    Ca    9.3      10 May 2025 07:52  Phos  2.4     05-10  Mg     2.1     05-10      PTT - ( 09 May 2025 12:00 )  PTT:79.5 sec  Urinalysis Basic - ( 10 May 2025 07:52 )    Color: x / Appearance: x / SG: x / pH: x  Gluc: 135 mg/dL / Ketone: x  / Bili: x / Urobili: x   Blood: x / Protein: x / Nitrite: x   Leuk Esterase: x / RBC: x / WBC x   Sq Epi: x / Non Sq Epi: x / Bacteria: x      CAPILLARY BLOOD GLUCOSE          RADIOLOGY & ADDITIONAL TESTS: Reviewed.

## 2025-05-10 NOTE — PROGRESS NOTE ADULT - SUBJECTIVE AND OBJECTIVE BOX
SUBJECTIVE:      MEDICATIONS  (STANDING):  acetaminophen   IVPB .. 750 milliGRAM(s) IV Intermittent every 8 hours  apixaban 5 milliGRAM(s) Oral every 12 hours  aspirin  chewable 81 milliGRAM(s) Oral daily  dextrose 5% + sodium chloride 0.45% with potassium chloride 20 mEq/L 1000 milliLiter(s) (60 mL/Hr) IV Continuous <Continuous>  lidocaine   4% Patch 2 Patch Transdermal daily  pantoprazole  Injectable 40 milliGRAM(s) IV Push daily  scopolamine 1 mG/72 Hr(s) Patch 1 Patch Transdermal every 72 hours    MEDICATIONS  (PRN):  naloxone Injectable 0.1 milliGRAM(s) IV Push every 3 minutes PRN For ANY of the following changes in patient status:  A. RR LESS THAN 10 breaths per minute, B. Oxygen saturation LESS THAN 90%, C. Sedation score of 6  ondansetron Injectable 4 milliGRAM(s) IV Push every 6 hours PRN Nausea and/or Vomiting      Vital Signs Last 24 Hrs  T(C): 37.2 (10 May 2025 04:38), Max: 37.2 (10 May 2025 04:38)  T(F): 99 (10 May 2025 04:38), Max: 99 (10 May 2025 04:38)  HR: 88 (10 May 2025 04:10) (76 - 90)  BP: 148/76 (10 May 2025 04:10) (132/89 - 159/71)  BP(mean): 105 (10 May 2025 04:10) (94 - 105)  RR: 16 (10 May 2025 04:10) (16 - 18)  SpO2: 97% (10 May 2025 04:10) (96% - 99%)    Parameters below as of 10 May 2025 04:10  Patient On (Oxygen Delivery Method): room air        PHYSICAL EXAM:      Constitutional: A&Ox3    Breasts:    Respiratory: non labored breathing, no respiratory distress    Cardiovascular: NSR, RRR    Gastrointestinal:                 Incision:    Genitourinary:    Extremities: (-) edema                  I&O's Detail    08 May 2025 07:01  -  09 May 2025 07:00  --------------------------------------------------------  IN:    dextrose 5% + sodium chloride 0.45% w/ Additives: 1200 mL    Heparin: 54 mL    Heparin: 60 mL    IV PiggyBack: 1450 mL  Total IN: 2764 mL    OUT:    Oral Fluid: 0 mL    Voided (mL): 2800 mL  Total OUT: 2800 mL    Total NET: -36 mL      09 May 2025 07:01  -  10 May 2025 06:19  --------------------------------------------------------  IN:    dextrose 5% + sodium chloride 0.45% w/ Additives: 540 mL    Heparin: 60 mL  Total IN: 600 mL    OUT:    Oral Fluid: 0 mL    Voided (mL): 1150 mL  Total OUT: 1150 mL    Total NET: -550 mL          LABS:                        10.6   10.57 )-----------( 563      ( 09 May 2025 06:44 )             31.8     05-09    139  |  102  |  6[L]  ----------------------------<  130[H]  3.9   |  24  |  0.41[L]    Ca    9.5      09 May 2025 06:44  Phos  1.7     05-09  Mg     2.1     05-09      PTT - ( 09 May 2025 12:00 )  PTT:79.5 sec  Urinalysis Basic - ( 09 May 2025 06:44 )    Color: x / Appearance: x / SG: x / pH: x  Gluc: 130 mg/dL / Ketone: x  / Bili: x / Urobili: x   Blood: x / Protein: x / Nitrite: x   Leuk Esterase: x / RBC: x / WBC x   Sq Epi: x / Non Sq Epi: x / Bacteria: x        RADIOLOGY & ADDITIONAL STUDIES: SUBJECTIVE:      MEDICATIONS  (STANDING):  acetaminophen   IVPB .. 750 milliGRAM(s) IV Intermittent every 8 hours  apixaban 5 milliGRAM(s) Oral every 12 hours  aspirin  chewable 81 milliGRAM(s) Oral daily  dextrose 5% + sodium chloride 0.45% with potassium chloride 20 mEq/L 1000 milliLiter(s) (60 mL/Hr) IV Continuous <Continuous>  lidocaine   4% Patch 2 Patch Transdermal daily  pantoprazole  Injectable 40 milliGRAM(s) IV Push daily  scopolamine 1 mG/72 Hr(s) Patch 1 Patch Transdermal every 72 hours    MEDICATIONS  (PRN):  naloxone Injectable 0.1 milliGRAM(s) IV Push every 3 minutes PRN For ANY of the following changes in patient status:  A. RR LESS THAN 10 breaths per minute, B. Oxygen saturation LESS THAN 90%, C. Sedation score of 6  ondansetron Injectable 4 milliGRAM(s) IV Push every 6 hours PRN Nausea and/or Vomiting      Vital Signs Last 24 Hrs  T(C): 37.2 (10 May 2025 04:38), Max: 37.2 (10 May 2025 04:38)  T(F): 99 (10 May 2025 04:38), Max: 99 (10 May 2025 04:38)  HR: 88 (10 May 2025 04:10) (76 - 90)  BP: 148/76 (10 May 2025 04:10) (132/89 - 159/71)  BP(mean): 105 (10 May 2025 04:10) (94 - 105)  RR: 16 (10 May 2025 04:10) (16 - 18)  SpO2: 97% (10 May 2025 04:10) (96% - 99%)    Parameters below as of 10 May 2025 04:10  Patient On (Oxygen Delivery Method): room air      PHYSICAL EXAM:  General: NAD, pt resting comfortably in bed  Pulm: No respiratory distress, nonlabored breathing, on room air  CVS: NSR, HDS  Abd: Soft, TTP surrounding midline incision which is c/d/i, ND  Extremities: WWP, no edema                  I&O's Detail    08 May 2025 07:01  -  09 May 2025 07:00  --------------------------------------------------------  IN:    dextrose 5% + sodium chloride 0.45% w/ Additives: 1200 mL    Heparin: 54 mL    Heparin: 60 mL    IV PiggyBack: 1450 mL  Total IN: 2764 mL    OUT:    Oral Fluid: 0 mL    Voided (mL): 2800 mL  Total OUT: 2800 mL    Total NET: -36 mL      09 May 2025 07:01  -  10 May 2025 06:19  --------------------------------------------------------  IN:    dextrose 5% + sodium chloride 0.45% w/ Additives: 540 mL    Heparin: 60 mL  Total IN: 600 mL    OUT:    Oral Fluid: 0 mL    Voided (mL): 1150 mL  Total OUT: 1150 mL    Total NET: -550 mL          LABS:                        10.6   10.57 )-----------( 563      ( 09 May 2025 06:44 )             31.8     05-09    139  |  102  |  6[L]  ----------------------------<  130[H]  3.9   |  24  |  0.41[L]    Ca    9.5      09 May 2025 06:44  Phos  1.7     05-09  Mg     2.1     05-09      PTT - ( 09 May 2025 12:00 )  PTT:79.5 sec  Urinalysis Basic - ( 09 May 2025 06:44 )    Color: x / Appearance: x / SG: x / pH: x  Gluc: 130 mg/dL / Ketone: x  / Bili: x / Urobili: x   Blood: x / Protein: x / Nitrite: x   Leuk Esterase: x / RBC: x / WBC x   Sq Epi: x / Non Sq Epi: x / Bacteria: x        RADIOLOGY & ADDITIONAL STUDIES: SUBJECTIVE: Pt seen and examined at bedside this AM. She endorses onset of flatus overnight, continues to endorse pain at abdominal incision     MEDICATIONS  (STANDING):  acetaminophen   IVPB .. 750 milliGRAM(s) IV Intermittent every 8 hours  apixaban 5 milliGRAM(s) Oral every 12 hours  aspirin  chewable 81 milliGRAM(s) Oral daily  dextrose 5% + sodium chloride 0.45% with potassium chloride 20 mEq/L 1000 milliLiter(s) (60 mL/Hr) IV Continuous <Continuous>  lidocaine   4% Patch 2 Patch Transdermal daily  pantoprazole  Injectable 40 milliGRAM(s) IV Push daily  scopolamine 1 mG/72 Hr(s) Patch 1 Patch Transdermal every 72 hours    MEDICATIONS  (PRN):  naloxone Injectable 0.1 milliGRAM(s) IV Push every 3 minutes PRN For ANY of the following changes in patient status:  A. RR LESS THAN 10 breaths per minute, B. Oxygen saturation LESS THAN 90%, C. Sedation score of 6  ondansetron Injectable 4 milliGRAM(s) IV Push every 6 hours PRN Nausea and/or Vomiting      Vital Signs Last 24 Hrs  T(C): 37.2 (10 May 2025 04:38), Max: 37.2 (10 May 2025 04:38)  T(F): 99 (10 May 2025 04:38), Max: 99 (10 May 2025 04:38)  HR: 88 (10 May 2025 04:10) (76 - 90)  BP: 148/76 (10 May 2025 04:10) (132/89 - 159/71)  BP(mean): 105 (10 May 2025 04:10) (94 - 105)  RR: 16 (10 May 2025 04:10) (16 - 18)  SpO2: 97% (10 May 2025 04:10) (96% - 99%)    Parameters below as of 10 May 2025 04:10  Patient On (Oxygen Delivery Method): room air      PHYSICAL EXAM:  General: NAD, pt resting comfortably in bed  Pulm: No respiratory distress, nonlabored breathing, on room air  CVS: NSR, HDS  Abd: Soft, TTP surrounding midline incision which is c/d/i, ND  Extremities: WWP, no edema                  I&O's Detail    08 May 2025 07:01  -  09 May 2025 07:00  --------------------------------------------------------  IN:    dextrose 5% + sodium chloride 0.45% w/ Additives: 1200 mL    Heparin: 54 mL    Heparin: 60 mL    IV PiggyBack: 1450 mL  Total IN: 2764 mL    OUT:    Oral Fluid: 0 mL    Voided (mL): 2800 mL  Total OUT: 2800 mL    Total NET: -36 mL      09 May 2025 07:01  -  10 May 2025 06:19  --------------------------------------------------------  IN:    dextrose 5% + sodium chloride 0.45% w/ Additives: 540 mL    Heparin: 60 mL  Total IN: 600 mL    OUT:    Oral Fluid: 0 mL    Voided (mL): 1150 mL  Total OUT: 1150 mL    Total NET: -550 mL          LABS:                        10.6   10.57 )-----------( 563      ( 09 May 2025 06:44 )             31.8     05-09    139  |  102  |  6[L]  ----------------------------<  130[H]  3.9   |  24  |  0.41[L]    Ca    9.5      09 May 2025 06:44  Phos  1.7     05-09  Mg     2.1     05-09      PTT - ( 09 May 2025 12:00 )  PTT:79.5 sec  Urinalysis Basic - ( 09 May 2025 06:44 )    Color: x / Appearance: x / SG: x / pH: x  Gluc: 130 mg/dL / Ketone: x  / Bili: x / Urobili: x   Blood: x / Protein: x / Nitrite: x   Leuk Esterase: x / RBC: x / WBC x   Sq Epi: x / Non Sq Epi: x / Bacteria: x        RADIOLOGY & ADDITIONAL STUDIES:

## 2025-05-11 LAB
ANION GAP SERPL CALC-SCNC: 11 MMOL/L — SIGNIFICANT CHANGE UP (ref 5–17)
BUN SERPL-MCNC: 8 MG/DL — SIGNIFICANT CHANGE UP (ref 7–23)
CALCIUM SERPL-MCNC: 9.4 MG/DL — SIGNIFICANT CHANGE UP (ref 8.4–10.5)
CHLORIDE SERPL-SCNC: 104 MMOL/L — SIGNIFICANT CHANGE UP (ref 96–108)
CO2 SERPL-SCNC: 23 MMOL/L — SIGNIFICANT CHANGE UP (ref 22–31)
CREAT SERPL-MCNC: 0.38 MG/DL — LOW (ref 0.5–1.3)
EGFR: 109 ML/MIN/1.73M2 — SIGNIFICANT CHANGE UP
EGFR: 109 ML/MIN/1.73M2 — SIGNIFICANT CHANGE UP
GLUCOSE SERPL-MCNC: 120 MG/DL — HIGH (ref 70–99)
HCT VFR BLD CALC: 34.9 % — SIGNIFICANT CHANGE UP (ref 34.5–45)
HGB BLD-MCNC: 11.4 G/DL — LOW (ref 11.5–15.5)
MAGNESIUM SERPL-MCNC: 2 MG/DL — SIGNIFICANT CHANGE UP (ref 1.6–2.6)
MCHC RBC-ENTMCNC: 31.8 PG — SIGNIFICANT CHANGE UP (ref 27–34)
MCHC RBC-ENTMCNC: 32.7 G/DL — SIGNIFICANT CHANGE UP (ref 32–36)
MCV RBC AUTO: 97.2 FL — SIGNIFICANT CHANGE UP (ref 80–100)
NRBC BLD AUTO-RTO: 0 /100 WBCS — SIGNIFICANT CHANGE UP (ref 0–0)
PHOSPHATE SERPL-MCNC: 3.3 MG/DL — SIGNIFICANT CHANGE UP (ref 2.5–4.5)
PLATELET # BLD AUTO: 664 K/UL — HIGH (ref 150–400)
POTASSIUM SERPL-MCNC: 3.7 MMOL/L — SIGNIFICANT CHANGE UP (ref 3.5–5.3)
POTASSIUM SERPL-SCNC: 3.7 MMOL/L — SIGNIFICANT CHANGE UP (ref 3.5–5.3)
RBC # BLD: 3.59 M/UL — LOW (ref 3.8–5.2)
RBC # FLD: 12.7 % — SIGNIFICANT CHANGE UP (ref 10.3–14.5)
SODIUM SERPL-SCNC: 138 MMOL/L — SIGNIFICANT CHANGE UP (ref 135–145)
WBC # BLD: 7.88 K/UL — SIGNIFICANT CHANGE UP (ref 3.8–10.5)
WBC # FLD AUTO: 7.88 K/UL — SIGNIFICANT CHANGE UP (ref 3.8–10.5)

## 2025-05-11 PROCEDURE — 99233 SBSQ HOSP IP/OBS HIGH 50: CPT

## 2025-05-11 RX ORDER — SODIUM CHLORIDE 9 G/1000ML
1000 INJECTION, SOLUTION INTRAVENOUS
Refills: 0 | Status: DISCONTINUED | OUTPATIENT
Start: 2025-05-11 | End: 2025-05-12

## 2025-05-11 RX ORDER — ACETAMINOPHEN 500 MG/5ML
650 LIQUID (ML) ORAL EVERY 6 HOURS
Refills: 0 | Status: DISCONTINUED | OUTPATIENT
Start: 2025-05-11 | End: 2025-05-12

## 2025-05-11 RX ORDER — ATORVASTATIN CALCIUM 80 MG/1
10 TABLET, FILM COATED ORAL AT BEDTIME
Refills: 0 | Status: DISCONTINUED | OUTPATIENT
Start: 2025-05-11 | End: 2025-05-12

## 2025-05-11 RX ORDER — POLYETHYLENE GLYCOL 3350 17 G/17G
17 POWDER, FOR SOLUTION ORAL DAILY
Refills: 0 | Status: DISCONTINUED | OUTPATIENT
Start: 2025-05-11 | End: 2025-05-12

## 2025-05-11 RX ORDER — SENNA 187 MG
1 TABLET ORAL DAILY
Refills: 0 | Status: DISCONTINUED | OUTPATIENT
Start: 2025-05-11 | End: 2025-05-12

## 2025-05-11 RX ADMIN — ATORVASTATIN CALCIUM 10 MILLIGRAM(S): 80 TABLET, FILM COATED ORAL at 21:19

## 2025-05-11 RX ADMIN — Medication 1 TABLET(S): at 21:19

## 2025-05-11 RX ADMIN — POLYETHYLENE GLYCOL 3350 17 GRAM(S): 17 POWDER, FOR SOLUTION ORAL at 21:18

## 2025-05-11 RX ADMIN — Medication 81 MILLIGRAM(S): at 12:54

## 2025-05-11 RX ADMIN — Medication 650 MILLIGRAM(S): at 22:19

## 2025-05-11 RX ADMIN — Medication 750 MILLIGRAM(S): at 01:35

## 2025-05-11 RX ADMIN — Medication 300 MILLIGRAM(S): at 01:15

## 2025-05-11 RX ADMIN — APIXABAN 5 MILLIGRAM(S): 2.5 TABLET, FILM COATED ORAL at 02:00

## 2025-05-11 RX ADMIN — Medication 300 MILLIGRAM(S): at 07:27

## 2025-05-11 RX ADMIN — SCOPOLAMINE 1 PATCH: 1 PATCH, EXTENDED RELEASE TRANSDERMAL at 06:36

## 2025-05-11 RX ADMIN — APIXABAN 5 MILLIGRAM(S): 2.5 TABLET, FILM COATED ORAL at 12:55

## 2025-05-11 RX ADMIN — Medication 300 MILLIGRAM(S): at 12:55

## 2025-05-11 RX ADMIN — Medication 650 MILLIGRAM(S): at 21:19

## 2025-05-11 RX ADMIN — LIDOCAINE HYDROCHLORIDE 2 PATCH: 20 JELLY TOPICAL at 00:39

## 2025-05-11 RX ADMIN — Medication 40 MILLIEQUIVALENT(S): at 07:51

## 2025-05-11 RX ADMIN — Medication 750 MILLIGRAM(S): at 07:41

## 2025-05-11 RX ADMIN — Medication 40 MILLIGRAM(S): at 09:10

## 2025-05-11 RX ADMIN — LIDOCAINE HYDROCHLORIDE 2 PATCH: 20 JELLY TOPICAL at 12:55

## 2025-05-11 RX ADMIN — SODIUM CHLORIDE 60 MILLILITER(S): 9 INJECTION, SOLUTION INTRAVENOUS at 19:48

## 2025-05-11 RX ADMIN — ANASTROZOLE 1 MILLIGRAM(S): 1 TABLET ORAL at 12:54

## 2025-05-11 NOTE — PROGRESS NOTE ADULT - SUBJECTIVE AND OBJECTIVE BOX
O/N: passing flatus, adv to cld, IVF switched to D5NS    Subjective: Patient doing well this AM, pain is well controlled with analgesics, marlen cld, nausea improved, +F/-BM, -n/-v.       Vital Signs Last 24 Hrs  T(C): 36.9 (11 May 2025 05:48), Max: 37.2 (10 May 2025 14:10)  T(F): 98.4 (11 May 2025 05:48), Max: 99 (10 May 2025 14:10)  HR: 84 (11 May 2025 04:30) (80 - 92)  BP: 137/80 (11 May 2025 04:30) (134/88 - 156/74)  BP(mean): 103 (11 May 2025 04:30) (94 - 105)  RR: 16 (11 May 2025 04:30) (16 - 18)  SpO2: 98% (11 May 2025 04:30) (98% - 100%)    Parameters below as of 11 May 2025 04:30  Patient On (Oxygen Delivery Method): room air      I&O's Summary    09 May 2025 07:01  -  10 May 2025 07:00  --------------------------------------------------------  IN: 720 mL / OUT: 1150 mL / NET: -430 mL    10 May 2025 07:01  -  11 May 2025 06:30  --------------------------------------------------------  IN: 1925 mL / OUT: 1475 mL / NET: 450 mL        PHYSICAL EXAM:  General: NAD, pt resting comfortably in bed  Pulm: No respiratory distress, nonlabored breathing, on room air  CVS: NSR, HDS  Abd: Soft, TTP surrounding midline incision which is c/d/i, minimally distended  Extremities: WWP, no edema      LABS:                        9.4    6.92  )-----------( 473      ( 10 May 2025 07:01 )             29.0     05-10    142  |  104  |  7   ----------------------------<  135[H]  3.8   |  27  |  0.39[L]    Ca    9.3      10 May 2025 07:52  Phos  2.4     05-10  Mg     2.1     05-10      PTT - ( 09 May 2025 12:00 )  PTT:79.5 sec    ---------------------------------------------------------------------------  PLEASE CHECK WHEN PRESENT:  [  ]Heart Failure     [  ] Acute       [  ] Acute on Chronic       [  ] Chronic       [  ] Diastolic [HFpEF]       [  ] Systolic [HFrEF]       [  ] Combined [HFpEF & HFrEF]  ---------------------------------------------------------------------------  [  ] Hypertensive Heart Disease  [  ] CAD  [  ] Type 2 MI  [  ] Atrial Fibrillation     [  ] Paroxysmal A-fib     [  ] Chronic A-fib     [  ] Persistent A-fib     [  ] Longstanding Persistent A-fib     [  ] Permanent A-fib  [  ] Pulmonary Hypertension  -------------------------------------------------------------------  [  ] Acute Respiratory Failure with  [  ] Hypoxia  [  ] Hypercapnia  [  ] Asthma with Acute Exacerbation  [  ] COPD with Acute Exacerbation  [  ] COPD on home O2 (Chronic Respiratory Failure)  [  ] Atelectasis  [  ] Pleural Effusion  [  ] Acute Pulmonary Embolism  [  ] Acute Cor Pulmonale  [  ] Obstructive Sleep Apnea  -------------------------------------------------------------------  [  ] Acute Kidney Injury      [  ] Acute Tubular Necrosis   [  ] Chronic Kidney Disease     [  ] CKD 1  [  ] CKD 2  [  ] CKD 3     [  ] CKD 4  [  ] CKD 5 (ESRD)  [  ] Fluid Overload  [  ] Acid/Base Disorder     [  ] Acidosis       [  ] Alkalosis  [  ] Electrolyte Abnormalities     [  ] Hyponatremia  [  ] Hypernatremia     [  ] Hypokalemia  [  ] Hyperkalemia     [  ] Hypomagnesemia     [  ] Hypophosphatemia  [  ] Hyperphosphatemia  -------------------------------------------------------------------  [  ] Diabetes  [  ] Type 1  [  ] Type 2  [  ] Diabetes with  [  ] Hypoglycemia  [  ] Hyperglycemia  [  ] Diabetes with Neuropathy  [  ] Neuropathic Ulcer due to Diabetes  [  ] Diabetes with Peripheral Angiopathy  [  ] Diabetic Ischemic Ulcer  [  ] Diabetic Foot Infection  [  ] Osteomyelitis due to Diabetes  --------------------------------------------------------------------  [  ] SIRS     [  ] Sepsis  [  ] Severe Sepsis  [  ] Septic Shock     [  ] Noninfectious SIRS  [  ] Pneumonia     [  ] Hospital Acquired Pneumonia     [  ] Aspiration Pneumonia     [  ] Pneumonia due to:  [  ]  UTI  ------------------------------------------------------------------------  [  ] Acute blood loss anemia  [  ] Post op blood loss anemia  [  ] Iron deficiency anemia  [  ] Folate deficiency anemia  [  ] B12 deficiency anemia  [  ] Anemia due to chronic disease  [  ] Thrombocytopenia  [  ] Hypercoagulable state  [  ] Acute DVT  ----------------------------------------------------------------------  [  ] Cerebrovascular Accident  [  ] Transient Ischemic Attack  [  ] Encephalopathy     [  ] Metabolic  [  ] Toxic  [  ] Toxic-Metabolic     [  ] Septic  [  ] Uremic  [  ] Hepatic  [  ] Hypertensive  ----------------------------------------------------------------------  [  ] Malnutrition: See Nutrition Note     [  ] Supplement Ordered:  [  ] Morbid Obesity (BMI >=40)  [  ] Ileus  ---------------------------------------------------------------------  [  ] Smoker  [  ] Former  [  ] Current  [  ] Functional Quadriplegia  [  ] Moderate Depression  [  ] Homelessness  [  ] Sheltered   [  ] Unsheltered       A/P: 70yo F with pmhx of Breast cancer (s/p lumpectomy, on Anastazole), osteoporosis, appendicitis (s/p lap appendectomy 4/5/25), recently diagnosed chronic mesenteric ischemia d/t severe SMA stenosis. Patient complaining of abdominal pain, weight loss, and food aversion since January of this year. Underwent mesenteric angio w/ SMA stent placement 4/14/25, had improvement in symptoms for few days and then symptoms returned, found to have occlusion of SMA stent on duplex, confirmed on dx mesenteric angio (5/2/25). Patient returns for aortic-mesenteric artery bypass. Is now s/p infrarenal AA bypass to SMA bypass with 8mm Hemashield graft.    #Chronic Mesenteric Ischemia  - s/p infrarenal abdominal aorta to SMA bypass 5/7  - Serial abdominal exams   - c/w ASA and statin  - c/w Eliquis  - nausea and pain control  - bowel regimen    #Breast Cancer s/p lumpectomy  - on anastrazole at home, f/u when to resume- onc says that patient should be on it and that it is unlikely the cause of her possible hypercoagulable state    Activity: as marlen  Diet: CLD + IVF  DVTppx: Eliquis  Dispo: 8lameg, HPT at [facility]

## 2025-05-11 NOTE — PROVIDER CONTACT NOTE (OTHER) - SITUATION
Pt incision site red and slightly swollen with bruising noted. Site appears slightly more swollen than the past night.

## 2025-05-11 NOTE — PROVIDER CONTACT NOTE (OTHER) - ASSESSMENT
Pt AOx4, all VSS, all assessments remain at baseline, pt denies pain.
Incision with redness, some swelling noticed at incisional line as well

## 2025-05-11 NOTE — PROVIDER CONTACT NOTE (OTHER) - ACTION/TREATMENT ORDERED:
RN notified MD while at bedside and MD inspected incisional site. No further interventions ordered at this time.
Provider aware, will come to bedside

## 2025-05-11 NOTE — PROGRESS NOTE ADULT - SUBJECTIVE AND OBJECTIVE BOX
OVERNIGHT EVENTS: NAEO    SUBJECTIVE / INTERVAL HPI: Patient seen and examined at bedside. Patient denying chest pain, SOB, palpitations, cough. Patient denies fever, chills, HA, Dizziness, N/V. Pain is well controlled. Passing flatus.   Remaining ROS negative       PHYSICAL EXAM:  General:NAD.   Appears slightly uncomfortable due to pain   MMM  Normal WOB on RA, CTAB   RRR, no mrg   Abdomen soft, diffusely tender to palpation but no rebound or guarding   Extremities warm and without edema   AOX3, no focal neuro deficits     VITAL SIGNS:  Vital Signs Last 24 Hrs  T(C): 36.6 (11 May 2025 08:56), Max: 37.2 (10 May 2025 14:10)  T(F): 97.9 (11 May 2025 08:56), Max: 99 (10 May 2025 14:10)  HR: 114 (11 May 2025 08:27) (80 - 114)  BP: 139/68 (11 May 2025 08:27) (134/88 - 153/70)  BP(mean): 97 (11 May 2025 08:27) (94 - 103)  RR: 18 (11 May 2025 08:27) (16 - 18)  SpO2: 92% (11 May 2025 08:27) (92% - 99%)    Parameters below as of 11 May 2025 08:27  Patient On (Oxygen Delivery Method): room air          MEDICATIONS:  MEDICATIONS  (STANDING):  anastrozole 1 milliGRAM(s) Oral daily  apixaban 5 milliGRAM(s) Oral every 12 hours  aspirin  chewable 81 milliGRAM(s) Oral daily  atorvastatin 10 milliGRAM(s) Oral at bedtime  dextrose 5% + sodium chloride 0.9%. 1000 milliLiter(s) (60 mL/Hr) IV Continuous <Continuous>  lidocaine   4% Patch 2 Patch Transdermal daily  pantoprazole    Tablet 40 milliGRAM(s) Oral before breakfast    MEDICATIONS  (PRN):  ondansetron Injectable 4 milliGRAM(s) IV Push every 6 hours PRN Nausea and/or Vomiting      ALLERGIES:  Allergies    erythromycin (Hives)  denosumab (Unknown)  Prolia (Other)  Augmentin (Unknown)  azithromycin (Hives)  amoxicillin-clavulanate (Unknown)  vancomycin (Hives)    Intolerances        LABS:                        11.4   7.88  )-----------( 664      ( 11 May 2025 07:02 )             34.9     05-11    138  |  104  |  8   ----------------------------<  120[H]  3.7   |  23  |  0.38[L]    Ca    9.4      11 May 2025 07:02  Phos  3.3     05-11  Mg     2.0     05-11        Urinalysis Basic - ( 11 May 2025 07:02 )    Color: x / Appearance: x / SG: x / pH: x  Gluc: 120 mg/dL / Ketone: x  / Bili: x / Urobili: x   Blood: x / Protein: x / Nitrite: x   Leuk Esterase: x / RBC: x / WBC x   Sq Epi: x / Non Sq Epi: x / Bacteria: x      CAPILLARY BLOOD GLUCOSE          RADIOLOGY & ADDITIONAL TESTS: Reviewed.

## 2025-05-12 ENCOUNTER — TRANSCRIPTION ENCOUNTER (OUTPATIENT)
Age: 70
End: 2025-05-12

## 2025-05-12 VITALS — TEMPERATURE: 98 F

## 2025-05-12 PROBLEM — M81.0 AGE-RELATED OSTEOPOROSIS WITHOUT CURRENT PATHOLOGICAL FRACTURE: Chronic | Status: ACTIVE | Noted: 2025-05-06

## 2025-05-12 PROBLEM — K55.1 CHRONIC VASCULAR DISORDERS OF INTESTINE: Chronic | Status: ACTIVE | Noted: 2025-05-06

## 2025-05-12 PROBLEM — C50.919 MALIGNANT NEOPLASM OF UNSPECIFIED SITE OF UNSPECIFIED FEMALE BREAST: Chronic | Status: ACTIVE | Noted: 2025-05-06

## 2025-05-12 PROBLEM — Z86.19 PERSONAL HISTORY OF OTHER INFECTIOUS AND PARASITIC DISEASES: Chronic | Status: ACTIVE | Noted: 2025-05-06

## 2025-05-12 PROBLEM — M26.609 UNSPECIFIED TEMPOROMANDIBULAR JOINT DISORDER, UNSPECIFIED SIDE: Chronic | Status: ACTIVE | Noted: 2025-05-06

## 2025-05-12 LAB
ALBUMIN SERPL ELPH-MCNC: 3.5 G/DL — SIGNIFICANT CHANGE UP (ref 3.3–5)
ALP SERPL-CCNC: 153 U/L — HIGH (ref 40–120)
ALT FLD-CCNC: 13 U/L — SIGNIFICANT CHANGE UP (ref 10–45)
ANION GAP SERPL CALC-SCNC: 14 MMOL/L — SIGNIFICANT CHANGE UP (ref 5–17)
AST SERPL-CCNC: 14 U/L — SIGNIFICANT CHANGE UP (ref 10–40)
BILIRUB SERPL-MCNC: 0.7 MG/DL — SIGNIFICANT CHANGE UP (ref 0.2–1.2)
BUN SERPL-MCNC: 8 MG/DL — SIGNIFICANT CHANGE UP (ref 7–23)
CALCIUM SERPL-MCNC: 9.3 MG/DL — SIGNIFICANT CHANGE UP (ref 8.4–10.5)
CHLORIDE SERPL-SCNC: 102 MMOL/L — SIGNIFICANT CHANGE UP (ref 96–108)
CO2 SERPL-SCNC: 22 MMOL/L — SIGNIFICANT CHANGE UP (ref 22–31)
CREAT SERPL-MCNC: 0.43 MG/DL — LOW (ref 0.5–1.3)
EGFR: 106 ML/MIN/1.73M2 — SIGNIFICANT CHANGE UP
EGFR: 106 ML/MIN/1.73M2 — SIGNIFICANT CHANGE UP
GLUCOSE SERPL-MCNC: 102 MG/DL — HIGH (ref 70–99)
HCT VFR BLD CALC: 33.4 % — LOW (ref 34.5–45)
HGB BLD-MCNC: 10.8 G/DL — LOW (ref 11.5–15.5)
MAGNESIUM SERPL-MCNC: 1.9 MG/DL — SIGNIFICANT CHANGE UP (ref 1.6–2.6)
MCHC RBC-ENTMCNC: 31.3 PG — SIGNIFICANT CHANGE UP (ref 27–34)
MCHC RBC-ENTMCNC: 32.3 G/DL — SIGNIFICANT CHANGE UP (ref 32–36)
MCV RBC AUTO: 96.8 FL — SIGNIFICANT CHANGE UP (ref 80–100)
NRBC BLD AUTO-RTO: 0 /100 WBCS — SIGNIFICANT CHANGE UP (ref 0–0)
PHOSPHATE SERPL-MCNC: 3.4 MG/DL — SIGNIFICANT CHANGE UP (ref 2.5–4.5)
PLATELET # BLD AUTO: 645 K/UL — HIGH (ref 150–400)
POTASSIUM SERPL-MCNC: 3.8 MMOL/L — SIGNIFICANT CHANGE UP (ref 3.5–5.3)
POTASSIUM SERPL-SCNC: 3.8 MMOL/L — SIGNIFICANT CHANGE UP (ref 3.5–5.3)
PROT SERPL-MCNC: 5.7 G/DL — LOW (ref 6–8.3)
RBC # BLD: 3.45 M/UL — LOW (ref 3.8–5.2)
RBC # FLD: 12.8 % — SIGNIFICANT CHANGE UP (ref 10.3–14.5)
SODIUM SERPL-SCNC: 138 MMOL/L — SIGNIFICANT CHANGE UP (ref 135–145)
WBC # BLD: 9.13 K/UL — SIGNIFICANT CHANGE UP (ref 3.8–10.5)
WBC # FLD AUTO: 9.13 K/UL — SIGNIFICANT CHANGE UP (ref 3.8–10.5)

## 2025-05-12 PROCEDURE — 84132 ASSAY OF SERUM POTASSIUM: CPT

## 2025-05-12 PROCEDURE — 99232 SBSQ HOSP IP/OBS MODERATE 35: CPT

## 2025-05-12 PROCEDURE — C1768: CPT

## 2025-05-12 PROCEDURE — 93005 ELECTROCARDIOGRAM TRACING: CPT

## 2025-05-12 PROCEDURE — 82947 ASSAY GLUCOSE BLOOD QUANT: CPT

## 2025-05-12 PROCEDURE — 86901 BLOOD TYPING SEROLOGIC RH(D): CPT

## 2025-05-12 PROCEDURE — 97165 OT EVAL LOW COMPLEX 30 MIN: CPT

## 2025-05-12 PROCEDURE — P9045: CPT

## 2025-05-12 PROCEDURE — 86923 COMPATIBILITY TEST ELECTRIC: CPT

## 2025-05-12 PROCEDURE — 85610 PROTHROMBIN TIME: CPT

## 2025-05-12 PROCEDURE — 85347 COAGULATION TIME ACTIVATED: CPT

## 2025-05-12 PROCEDURE — 85730 THROMBOPLASTIN TIME PARTIAL: CPT

## 2025-05-12 PROCEDURE — 82330 ASSAY OF CALCIUM: CPT

## 2025-05-12 PROCEDURE — 83605 ASSAY OF LACTIC ACID: CPT

## 2025-05-12 PROCEDURE — C1889: CPT

## 2025-05-12 PROCEDURE — 36415 COLL VENOUS BLD VENIPUNCTURE: CPT

## 2025-05-12 PROCEDURE — 85027 COMPLETE CBC AUTOMATED: CPT

## 2025-05-12 PROCEDURE — 84100 ASSAY OF PHOSPHORUS: CPT

## 2025-05-12 PROCEDURE — 82803 BLOOD GASES ANY COMBINATION: CPT

## 2025-05-12 PROCEDURE — 83735 ASSAY OF MAGNESIUM: CPT

## 2025-05-12 PROCEDURE — 85014 HEMATOCRIT: CPT

## 2025-05-12 PROCEDURE — 80048 BASIC METABOLIC PNL TOTAL CA: CPT

## 2025-05-12 PROCEDURE — 74018 RADEX ABDOMEN 1 VIEW: CPT

## 2025-05-12 PROCEDURE — 84295 ASSAY OF SERUM SODIUM: CPT

## 2025-05-12 PROCEDURE — 80053 COMPREHEN METABOLIC PANEL: CPT

## 2025-05-12 PROCEDURE — 86900 BLOOD TYPING SEROLOGIC ABO: CPT

## 2025-05-12 PROCEDURE — C9399: CPT

## 2025-05-12 PROCEDURE — 97530 THERAPEUTIC ACTIVITIES: CPT

## 2025-05-12 PROCEDURE — C1781: CPT

## 2025-05-12 PROCEDURE — 97161 PT EVAL LOW COMPLEX 20 MIN: CPT

## 2025-05-12 PROCEDURE — C9248: CPT

## 2025-05-12 PROCEDURE — 86850 RBC ANTIBODY SCREEN: CPT

## 2025-05-12 RX ORDER — BISACODYL 5 MG
10 TABLET, DELAYED RELEASE (ENTERIC COATED) ORAL ONCE
Refills: 0 | Status: DISCONTINUED | OUTPATIENT
Start: 2025-05-12 | End: 2025-05-12

## 2025-05-12 RX ORDER — APIXABAN 2.5 MG/1
1 TABLET, FILM COATED ORAL
Qty: 60 | Refills: 1
Start: 2025-05-12 | End: 2025-07-10

## 2025-05-12 RX ORDER — MAGNESIUM SULFATE 500 MG/ML
1 SYRINGE (ML) INJECTION ONCE
Refills: 0 | Status: COMPLETED | OUTPATIENT
Start: 2025-05-12 | End: 2025-05-12

## 2025-05-12 RX ORDER — BISACODYL 5 MG
10 TABLET, DELAYED RELEASE (ENTERIC COATED) ORAL ONCE
Refills: 0 | Status: COMPLETED | OUTPATIENT
Start: 2025-05-12 | End: 2025-05-12

## 2025-05-12 RX ADMIN — Medication 40 MILLIGRAM(S): at 06:52

## 2025-05-12 RX ADMIN — Medication 81 MILLIGRAM(S): at 11:08

## 2025-05-12 RX ADMIN — APIXABAN 5 MILLIGRAM(S): 2.5 TABLET, FILM COATED ORAL at 13:04

## 2025-05-12 RX ADMIN — Medication 1 TABLET(S): at 11:08

## 2025-05-12 RX ADMIN — LIDOCAINE HYDROCHLORIDE 2 PATCH: 20 JELLY TOPICAL at 11:08

## 2025-05-12 RX ADMIN — Medication 100 GRAM(S): at 09:21

## 2025-05-12 RX ADMIN — APIXABAN 5 MILLIGRAM(S): 2.5 TABLET, FILM COATED ORAL at 01:50

## 2025-05-12 RX ADMIN — Medication 650 MILLIGRAM(S): at 15:17

## 2025-05-12 RX ADMIN — POLYETHYLENE GLYCOL 3350 17 GRAM(S): 17 POWDER, FOR SOLUTION ORAL at 11:08

## 2025-05-12 RX ADMIN — ANASTROZOLE 1 MILLIGRAM(S): 1 TABLET ORAL at 11:09

## 2025-05-12 RX ADMIN — Medication 650 MILLIGRAM(S): at 05:03

## 2025-05-12 RX ADMIN — LIDOCAINE HYDROCHLORIDE 2 PATCH: 20 JELLY TOPICAL at 01:45

## 2025-05-12 RX ADMIN — Medication 10 MILLIGRAM(S): at 08:40

## 2025-05-12 RX ADMIN — Medication 650 MILLIGRAM(S): at 04:03

## 2025-05-12 RX ADMIN — Medication 20 MILLIEQUIVALENT(S): at 09:21

## 2025-05-12 NOTE — DISCHARGE NOTE NURSING/CASE MANAGEMENT/SOCIAL WORK - FINANCIAL ASSISTANCE
[facility] provides services at a reduced cost to those who are determined to be eligible through [facility]’s financial assistance program. Information regarding [facility]’s financial assistance program can be found by going to [email or web address] or by calling [phone].

## 2025-05-12 NOTE — PROGRESS NOTE ADULT - SUBJECTIVE AND OBJECTIVE BOX
O/N: CHARANJIT, vitals sign stable      ---------------------------------------------------------------------------  PLEASE CHECK WHEN PRESENT:  [  ]Heart Failure     [  ] Acute       [  ] Acute on Chronic       [  ] Chronic       [  ] Diastolic [HFpEF]       [  ] Systolic [HFrEF]       [  ] Combined [HFpEF & HFrEF]  ---------------------------------------------------------------------------  [  ] Hypertensive Heart Disease  [  ] CAD  [  ] Type 2 MI  [  ] Atrial Fibrillation     [  ] Paroxysmal A-fib     [  ] Chronic A-fib     [  ] Persistent A-fib     [  ] Longstanding Persistent A-fib     [  ] Permanent A-fib  [  ] Pulmonary Hypertension  -------------------------------------------------------------------  [  ] Acute Respiratory Failure with  [  ] Hypoxia  [  ] Hypercapnia  [  ] Asthma with Acute Exacerbation  [  ] COPD with Acute Exacerbation  [  ] COPD on home O2 (Chronic Respiratory Failure)  [  ] Atelectasis  [  ] Pleural Effusion  [  ] Acute Pulmonary Embolism  [  ] Acute Cor Pulmonale  [  ] Obstructive Sleep Apnea  -------------------------------------------------------------------  [  ] Acute Kidney Injury      [  ] Acute Tubular Necrosis   [  ] Chronic Kidney Disease     [  ] CKD 1  [  ] CKD 2  [  ] CKD 3     [  ] CKD 4  [  ] CKD 5 (ESRD)  [  ] Fluid Overload  [  ] Acid/Base Disorder     [  ] Acidosis       [  ] Alkalosis  [  ] Electrolyte Abnormalities     [  ] Hyponatremia  [  ] Hypernatremia     [  ] Hypokalemia  [  ] Hyperkalemia     [  ] Hypomagnesemia     [  ] Hypophosphatemia  [  ] Hyperphosphatemia  -------------------------------------------------------------------  [  ] Diabetes  [  ] Type 1  [  ] Type 2  [  ] Diabetes with  [  ] Hypoglycemia  [  ] Hyperglycemia  [  ] Diabetes with Neuropathy  [  ] Neuropathic Ulcer due to Diabetes  [  ] Diabetes with Peripheral Angiopathy  [  ] Diabetic Ischemic Ulcer  [  ] Diabetic Foot Infection  [  ] Osteomyelitis due to Diabetes  --------------------------------------------------------------------  [  ] SIRS     [  ] Sepsis  [  ] Severe Sepsis  [  ] Septic Shock     [  ] Noninfectious SIRS  [  ] Pneumonia     [  ] Hospital Acquired Pneumonia     [  ] Aspiration Pneumonia     [  ] Pneumonia due to:  [  ]  UTI  ------------------------------------------------------------------------  [  ] Acute blood loss anemia  [  ] Post op blood loss anemia  [  ] Iron deficiency anemia  [  ] Folate deficiency anemia  [  ] B12 deficiency anemia  [  ] Anemia due to chronic disease  [  ] Thrombocytopenia  [  ] Hypercoagulable state  [  ] Acute DVT  ----------------------------------------------------------------------  [  ] Cerebrovascular Accident  [  ] Transient Ischemic Attack  [  ] Encephalopathy     [  ] Metabolic  [  ] Toxic  [  ] Toxic-Metabolic     [  ] Septic  [  ] Uremic  [  ] Hepatic  [  ] Hypertensive  ----------------------------------------------------------------------  [  ] Malnutrition: See Nutrition Note     [  ] Supplement Ordered:  [  ] Morbid Obesity (BMI >=40)  [  ] Ileus  ---------------------------------------------------------------------  [  ] Smoker  [  ] Former  [  ] Current  [  ] Functional Quadriplegia  [  ] Moderate Depression  [  ] Homelessness  [  ] Sheltered   [  ] Unsheltered       A/P: 68yo F with pmhx of Breast cancer (s/p lumpectomy, on Anastazole), osteoporosis, appendicitis (s/p lap appendectomy 4/5/25), recently diagnosed chronic mesenteric ischemia d/t severe SMA stenosis. Patient complaining of abdominal pain, weight loss, and food aversion since January of this year. Underwent mesenteric angio w/ SMA stent placement 4/14/25, had improvement in symptoms for few days and then symptoms returned, found to have occlusion of SMA stent on duplex, confirmed on dx mesenteric angio (5/2/25). Patient returns for aortic-mesenteric artery bypass. Is now s/p infrarenal AA bypass to SMA bypass with 8mm Hemashield graft.    #Chronic Mesenteric Ischemia  - s/p infrarenal abdominal aorta to SMA bypass 5/7  - Serial abdominal exams   - c/w ASA and statin  - c/w Eliquis  - nausea and pain control  - bowel regimen    #Breast Cancer s/p lumpectomy  - on anastrazole at home, f/u when to resume- onc says that patient should be on it and that it is unlikely the cause of her possible hypercoagulable state    Activity: as marlen  Diet: CLD + IVF  DVTppx: Eliquis  Dispo: [facility], HPT at dc O/N: CHARANJIT, vitals sign stable    Subjective: Emotional this AM but reports that pain is well controlled with tylenol. Passing flatus, no BM, marlen cld, no other complaints.     PHYSICAL EXAM:  General: NAD, pt resting comfortably in bed  Pulm: No respiratory distress, nonlabored breathing, on room air  CVS: NSR, HDS  Abd: Soft, appropriate bartolome-incisional tenderness, minimally distended. Midline incision with improving ecchymosis.   Extremities: WWP, no edema    ---------------------------------------------------------------------------  PLEASE CHECK WHEN PRESENT:  [  ]Heart Failure     [  ] Acute       [  ] Acute on Chronic       [  ] Chronic       [  ] Diastolic [HFpEF]       [  ] Systolic [HFrEF]       [  ] Combined [HFpEF & HFrEF]  ---------------------------------------------------------------------------  [  ] Hypertensive Heart Disease  [  ] CAD  [  ] Type 2 MI  [  ] Atrial Fibrillation     [  ] Paroxysmal A-fib     [  ] Chronic A-fib     [  ] Persistent A-fib     [  ] Longstanding Persistent A-fib     [  ] Permanent A-fib  [  ] Pulmonary Hypertension  -------------------------------------------------------------------  [  ] Acute Respiratory Failure with  [  ] Hypoxia  [  ] Hypercapnia  [  ] Asthma with Acute Exacerbation  [  ] COPD with Acute Exacerbation  [  ] COPD on home O2 (Chronic Respiratory Failure)  [  ] Atelectasis  [  ] Pleural Effusion  [  ] Acute Pulmonary Embolism  [  ] Acute Cor Pulmonale  [  ] Obstructive Sleep Apnea  -------------------------------------------------------------------  [  ] Acute Kidney Injury      [  ] Acute Tubular Necrosis   [  ] Chronic Kidney Disease     [  ] CKD 1  [  ] CKD 2  [  ] CKD 3     [  ] CKD 4  [  ] CKD 5 (ESRD)  [  ] Fluid Overload  [  ] Acid/Base Disorder     [  ] Acidosis       [  ] Alkalosis  [  ] Electrolyte Abnormalities     [  ] Hyponatremia  [  ] Hypernatremia     [  ] Hypokalemia  [  ] Hyperkalemia     [  ] Hypomagnesemia     [  ] Hypophosphatemia  [  ] Hyperphosphatemia  -------------------------------------------------------------------  [  ] Diabetes  [  ] Type 1  [  ] Type 2  [  ] Diabetes with  [  ] Hypoglycemia  [  ] Hyperglycemia  [  ] Diabetes with Neuropathy  [  ] Neuropathic Ulcer due to Diabetes  [  ] Diabetes with Peripheral Angiopathy  [  ] Diabetic Ischemic Ulcer  [  ] Diabetic Foot Infection  [  ] Osteomyelitis due to Diabetes  --------------------------------------------------------------------  [  ] SIRS     [  ] Sepsis  [  ] Severe Sepsis  [  ] Septic Shock     [  ] Noninfectious SIRS  [  ] Pneumonia     [  ] Hospital Acquired Pneumonia     [  ] Aspiration Pneumonia     [  ] Pneumonia due to:  [  ]  UTI  ------------------------------------------------------------------------  [  ] Acute blood loss anemia  [  ] Post op blood loss anemia  [  ] Iron deficiency anemia  [  ] Folate deficiency anemia  [  ] B12 deficiency anemia  [  ] Anemia due to chronic disease  [  ] Thrombocytopenia  [  ] Hypercoagulable state  [  ] Acute DVT  ----------------------------------------------------------------------  [  ] Cerebrovascular Accident  [  ] Transient Ischemic Attack  [  ] Encephalopathy     [  ] Metabolic  [  ] Toxic  [  ] Toxic-Metabolic     [  ] Septic  [  ] Uremic  [  ] Hepatic  [  ] Hypertensive  ----------------------------------------------------------------------  [  ] Malnutrition: See Nutrition Note     [  ] Supplement Ordered:  [  ] Morbid Obesity (BMI >=40)  [  ] Ileus  ---------------------------------------------------------------------  [  ] Smoker  [  ] Former  [  ] Current  [  ] Functional Quadriplegia  [  ] Moderate Depression  [  ] Homelessness  [  ] Sheltered   [  ] Unsheltered       A/P: 68yo F with pmhx of Breast cancer (s/p lumpectomy, on Anastazole), osteoporosis, appendicitis (s/p lap appendectomy 4/5/25), recently diagnosed chronic mesenteric ischemia d/t severe SMA stenosis. Patient complaining of abdominal pain, weight loss, and food aversion since January of this year. Underwent mesenteric angio w/ SMA stent placement 4/14/25, had improvement in symptoms for few days and then symptoms returned, found to have occlusion of SMA stent on duplex, confirmed on dx mesenteric angio (5/2/25). Patient returns for aortic-mesenteric artery bypass. Is now s/p infrarenal AA bypass to SMA bypass with 8mm Hemashield graft.    #Chronic Mesenteric Ischemia  - s/p infrarenal abdominal aorta to SMA bypass 5/7  - Serial abdominal exams   - c/w ASA and statin  - c/w Eliquis  - nausea and pain control  - bowel regimen    #Breast Cancer s/p lumpectomy  - on anastrazole at home, f/u when to resume- onc says that patient should be on it and that it is unlikely the cause of her possible hypercoagulable state    Activity: as marlen  Diet: CLD + IVF  DVTppx: Eliquis  Dispo: [facility], HPT at OR

## 2025-05-12 NOTE — PROGRESS NOTE ADULT - NUTRITIONAL ASSESSMENT
This patient has been assessed with a concern for Malnutrition and has been determined to have a diagnosis/diagnoses of Severe protein-calorie malnutrition and Underweight (BMI < 19).    This patient is being managed with:   Diet Clear Liquid-  Entered: May 10 2025  9:42PM  
This patient has been assessed with a concern for Malnutrition and has been determined to have a diagnosis/diagnoses of Severe protein-calorie malnutrition and Underweight (BMI < 19).    This patient is being managed with:   Diet NPO-  Except Medications  Entered: May  7 2025  5:57PM  
This patient has been assessed with a concern for Malnutrition and has been determined to have a diagnosis/diagnoses of Severe protein-calorie malnutrition and Underweight (BMI < 19).    This patient is being managed with:   Diet NPO-  Except Medications  Entered: May  7 2025  5:57PM  
This patient has been assessed with a concern for Malnutrition and has been determined to have a diagnosis/diagnoses of Severe protein-calorie malnutrition and Underweight (BMI < 19).    This patient is being managed with:   Diet Clear Liquid-  Entered: May 10 2025  9:42PM  
This patient has been assessed with a concern for Malnutrition and has been determined to have a diagnosis/diagnoses of Severe protein-calorie malnutrition and Underweight (BMI < 19).    This patient is being managed with:   Diet NPO-  Except Medications  Entered: May  7 2025  5:57PM  
This patient has been assessed with a concern for Malnutrition and has been determined to have a diagnosis/diagnoses of Severe protein-calorie malnutrition and Underweight (BMI < 19).    This patient is being managed with:   Diet Regular-  Entered: May 12 2025 11:09AM

## 2025-05-12 NOTE — PROGRESS NOTE ADULT - ASSESSMENT
A/P: 70yo F with pmhx of Breast cancer (s/p lumpectomy, on Anastazole), osteoporosis, appendicitis (s/p lap appendectomy 4/5/25), recently diagnosed chronic mesenteric ischemia d/t severe SMA stenosis. Patient complaining of abdominal pain, weight loss, and food aversion since January of this year. Underwent mesenteric angio w/ SMA stent placement 4/14/25, had improvement in symptoms for few days and then symptoms returned, found to have occlusion of SMA stent on duplex, confirmed on dx mesenteric angio (5/2/25). Patient returns for aortic-mesenteric artery bypass. Is now s/p infrarenal AA bypass to SMA bypass with 8mm Hemashield graft.    #Chronic Mesenteric Ischemia  - s/p infrarenal abdominal aorta to SMA bypass 5/7  - Serial abdominal exams   - c/w ASA and statin  - c/w hep gtt  - nausea and pain control  - bowel regimen    #Breast Cancer s/p lumpectomy  - on anastrazole at home, f/u when to resume- onc says that patient should be on it and that it is unlikely the cause of her possible hypercoagulable state    Activity: as marlen  Diet: NPO + IVF  DVTppx: hep gtt  Dispo: [facility]  
Ms. Hair is a 68 yo F with a PMH of breast cancer (s/p lumpectomy, currently on anastrazole), osteoporosis, and recently diagnosed chronic mesenteric ischemia presented for elective aortic-mesenteric artery bypass completed on 5/7. Medicine following for comanagement.     #SMA occlusion   #Chronic mesenteric ischemia   -s/p aorta-mesentery bypass by vascular   -pain mgmt per vascular team, did not tolerate PCA - will trial acetaminophen/codeine 300mg/30mg q4, monitor CMP, will try and limit NSAID/Opiates   -started on ASA 81 mg daily  -Eliquis 5 mg BID for AC     #Breast cancer   -takes anastrazole, holding here while admitted. Discussed with her outpatient oncologist at [facility] [place] Dr. Jessa Atkins, anastrazole is essential for patient to continue taking, should not increase the risk of clotting and although there are alternatives (such as tamoxifen) alternatives would be more likely to induce clotting. Plan to remain on anastrazole.     #Leukocytosis  -no infectious signs, likely reactive from bypass procedure   -continue to monitor, if fevers then collect CXR, UA, blood cultures     #Anemia  -would check vitamin B12, folate, iron studies, reticulocytes  -monitor on daily CBC, transfuse for hemoglobin of 7 or lower   
Ms. Hair is a 70 yo F with a PMH of breast cancer (s/p lumpectomy, currently on anastrazole), osteoporosis, and recently diagnosed chronic mesenteric ischemia presented for elective aortic-mesenteric artery bypass completed on 5/7. Medicine following for comanagement.     #SMA occlusion   #Chronic mesenteric ischemia   -s/p aorta-mesentery bypass by vascular   -pain mgmt per vascular team, did not tolerate PCA - will trial acetaminophen/codeine 300mg/30mg q4, monitor CMP, will try and limit NSAID/Opiates   -started on ASA 81 mg daily  -Eliquis 5 mg BID for AC     #Breast cancer   -takes anastrazole, holding here while admitted. Discussed with her outpatient oncologist at [facility] [place] Dr. Jessa Atkins, anastrazole is essential for patient to continue taking, should not increase the risk of clotting and although there are alternatives (such as tamoxifen) alternatives would be more likely to induce clotting. Plan to remain on anastrazole.     #Leukocytosis  -no infectious signs, likely reactive from bypass procedure   -continue to monitor, if fevers then collect CXR, UA, blood cultures     #Anemia  -would check vitamin B12, folate, iron studies, reticulocytes  -monitor on daily CBC, transfuse for hemoglobin of 7 or lower     #Hypophosphatemia   -replete to goal phos of 2.5 or greater   
Ms. Hair is a 70 yo F with a PMH of breast cancer (s/p lumpectomy, currently on anastrazole), osteoporosis, and recently diagnosed chronic mesenteric ischemia presented for elective aortic-mesenteric artery bypass completed on 5/7. Medicine following for comanagement.     #SMA occlusion   #Chronic mesenteric ischemia   -s/p aorta-mesentery bypass by vascular   -pain mgmt per vascular team, PRN tylenol   -started on ASA 81 mg daily  -Eliquis 5 mg BID for AC     #Breast cancer   -takes anastrazole, holding here while admitted. Discussed with her outpatient oncologist at [facility] [place] Dr. Jessa Atkins, anastrazole is essential for patient to continue taking, should not increase the risk of clotting and although there are alternatives (such as tamoxifen) alternatives would be more likely to induce clotting. Plan to remain on anastrazole.     #Anemia  -would check vitamin B12, folate, iron studies, reticulocytes  -monitor on daily CBC, transfuse for hemoglobin of 7 or lower   
Ms. Hair is a 70 yo F with a PMH of breast cancer (s/p lumpectomy, currently on anastrazole), osteoporosis, and recently diagnosed chronic mesenteric ischemia presented for elective aortic-mesenteric artery bypass completed on 5/7. Medicine following for comanagement.     #SMA occlusion   #Chronic mesenteric ischemia   -s/p aorta-mesentery bypass by vascular   -pain mgmt per vascular team, planned to trial PCA   -started on ASA 81 mg daily  -started on eliquis 5 mg BID for AC     #Breast cancer   -takes anastrazole, holding here while admitted. Discussed with her outpatient oncologist at [facility] [place] Dr. Jessa Atkins, anastrazole is essential for patient to continue taking, should not increase the risk of clotting and although there are alternatives (such as tamoxifen) alternatives would be more likely to induce clotting. Plan to remain on anastrazole.     #Leukocytosis  -no infectious signs, likely reactive from bypass procedure   -continue to monitor, if fevers then collect CXR, UA, blood cultures     #Anemia  -would check vitamin B12, folate, iron studies, reticulocytes  -monitor on daily CBC, transfuse for hemoglobin of 7 or lower     #Hypophosphatemia   -replete to goal phos of 2.5 or greater

## 2025-05-12 NOTE — PROGRESS NOTE ADULT - SUBJECTIVE AND OBJECTIVE BOX
Medicine Progress Note    Patient is a 69y old  Female who presents with a chief complaint of K55.1     (08 May 2025 14:21)      SUBJECTIVE / OVERNIGHT EVENTS:  No new concerns this morning. Passing gas but has not yet had bowel movement.     MEDICATIONS  (STANDING):  anastrozole 1 milliGRAM(s) Oral daily  apixaban 5 milliGRAM(s) Oral every 12 hours  aspirin  chewable 81 milliGRAM(s) Oral daily  atorvastatin 10 milliGRAM(s) Oral at bedtime  lactated ringers. 1000 milliLiter(s) (60 mL/Hr) IV Continuous <Continuous>  lidocaine   4% Patch 2 Patch Transdermal daily  pantoprazole    Tablet 40 milliGRAM(s) Oral before breakfast  polyethylene glycol 3350 17 Gram(s) Oral daily  senna 1 Tablet(s) Oral daily    MEDICATIONS  (PRN):  acetaminophen     Tablet .. 650 milliGRAM(s) Oral every 6 hours PRN Mild Pain (1 - 3)  ondansetron Injectable 4 milliGRAM(s) IV Push every 6 hours PRN Nausea and/or Vomiting    CAPILLARY BLOOD GLUCOSE        I&O's Summary    11 May 2025 07:01  -  12 May 2025 07:00  --------------------------------------------------------  IN: 2770 mL / OUT: 1200 mL / NET: 1570 mL        PHYSICAL EXAM:  Vital Signs Last 24 Hrs  T(C): 36.8 (12 May 2025 08:55), Max: 37.3 (12 May 2025 05:17)  T(F): 98.2 (12 May 2025 08:55), Max: 99.1 (12 May 2025 05:17)  HR: 90 (12 May 2025 08:05) (78 - 92)  BP: 152/73 (12 May 2025 08:05) (141/80 - 156/89)  BP(mean): 105 (12 May 2025 08:05) (90 - 110)  RR: 16 (12 May 2025 08:05) (16 - 18)  SpO2: 98% (12 May 2025 08:05) (97% - 98%)    Parameters below as of 12 May 2025 08:05  Patient On (Oxygen Delivery Method): room air      EXAM (truncated due to patient request):   Appears comfortable sitting up in bed   MMM  Normal WOB on RA  Abdomen nondistended   Extremities without edema   No gross focal neuro deficits     LABS:                        10.8   9.13  )-----------( 645      ( 12 May 2025 07:09 )             33.4     05-12    138  |  102  |  8   ----------------------------<  102[H]  3.8   |  22  |  0.43[L]    Ca    9.3      12 May 2025 07:09  Phos  3.4     05-12  Mg     1.9     05-12    TPro  5.7[L]  /  Alb  3.5  /  TBili  0.7  /  DBili  x   /  AST  14  /  ALT  13  /  AlkPhos  153[H]  05-12          Urinalysis Basic - ( 12 May 2025 07:09 )    Color: x / Appearance: x / SG: x / pH: x  Gluc: 102 mg/dL / Ketone: x  / Bili: x / Urobili: x   Blood: x / Protein: x / Nitrite: x   Leuk Esterase: x / RBC: x / WBC x   Sq Epi: x / Non Sq Epi: x / Bacteria: x            RADIOLOGY & ADDITIONAL TESTS:  Imaging from Last 24 Hours:    None new interval

## 2025-05-12 NOTE — DISCHARGE NOTE NURSING/CASE MANAGEMENT/SOCIAL WORK - PATIENT PORTAL LINK FT
You can access the FollowMyHealth Patient Portal offered by [facility] by registering at the following website: [email or web address]. By joining [employer]’s FollowMyHealth portal, you will also be able to view your health information using other applications (apps) compatible with our system.

## 2025-05-12 NOTE — PROGRESS NOTE ADULT - PROVIDER SPECIALTY LIST ADULT
Hospitalist
Hospitalist
Vascular Surgery
Hospitalist
Internal Medicine

## 2025-05-16 DIAGNOSIS — M81.0 AGE-RELATED OSTEOPOROSIS WITHOUT CURRENT PATHOLOGICAL FRACTURE: ICD-10-CM
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
## 2025-05-16 DIAGNOSIS — E83.39 OTHER DISORDERS OF PHOSPHORUS METABOLISM: ICD-10-CM

## 2025-05-16 DIAGNOSIS — Z79.82 LONG TERM (CURRENT) USE OF ASPIRIN: ICD-10-CM
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
## 2025-05-16 DIAGNOSIS — D53.9 NUTRITIONAL ANEMIA, UNSPECIFIED: ICD-10-CM

## 2025-05-16 DIAGNOSIS — K55.1 CHRONIC VASCULAR DISORDERS OF INTESTINE: ICD-10-CM

## 2025-05-16 DIAGNOSIS — E43 UNSPECIFIED SEVERE PROTEIN-CALORIE MALNUTRITION: ICD-10-CM

## 2025-05-16 DIAGNOSIS — T82.856A STENOSIS OF PERIPHERAL VASCULAR STENT, INITIAL ENCOUNTER: ICD-10-CM

## 2025-05-16 DIAGNOSIS — Z85.3 PERSONAL HISTORY OF MALIGNANT NEOPLASM OF BREAST: ICD-10-CM

## 2025-05-16 DIAGNOSIS — D68.59 OTHER PRIMARY THROMBOPHILIA: ICD-10-CM

## 2025-05-16 DIAGNOSIS — Z88.1 ALLERGY STATUS TO OTHER ANTIBIOTIC AGENTS: ICD-10-CM

## 2025-05-16 DIAGNOSIS — Z79.811 LONG TERM (CURRENT) USE OF AROMATASE INHIBITORS: ICD-10-CM

## 2025-05-27 ENCOUNTER — APPOINTMENT (OUTPATIENT)
Dept: VASCULAR SURGERY | Facility: CLINIC | Age: 70
End: 2025-05-27
Payer: COMMERCIAL

## 2025-05-27 VITALS
WEIGHT: 107 LBS | HEIGHT: 67 IN | BODY MASS INDEX: 16.79 KG/M2 | SYSTOLIC BLOOD PRESSURE: 118 MMHG | DIASTOLIC BLOOD PRESSURE: 77 MMHG | HEART RATE: 92 BPM

## 2025-05-27 DIAGNOSIS — K55.1 CHRONIC VASCULAR DISORDERS OF INTESTINE: ICD-10-CM

## 2025-05-27 PROCEDURE — 93976 VASCULAR STUDY: CPT

## 2025-05-27 PROCEDURE — 99024 POSTOP FOLLOW-UP VISIT: CPT

## 2025-06-03 RX ORDER — APIXABAN 5 MG/1
5 TABLET, FILM COATED ORAL
Qty: 90 | Refills: 1 | Status: ACTIVE | COMMUNITY
Start: 2025-06-03 | End: 1900-01-01

## (undated) DEVICE — DRAPE IOBAN 13" X 13"

## (undated) DEVICE — TROCAR COVIDIEN VERSAPORT BLADELESS OPTICAL 5MM STANDARD

## (undated) DEVICE — GLV 7.5 PROTEXIS (WHITE)

## (undated) DEVICE — Device

## (undated) DEVICE — SUT PROLENE 6-0 30" RB-2

## (undated) DEVICE — CLIPPER BLADE GENERAL USE

## (undated) DEVICE — PACK VASCULAR MINOR

## (undated) DEVICE — SOL IRR BAG NS 0.9% 3000ML

## (undated) DEVICE — SUT MONOCRYL 4-0 18" PS-2

## (undated) DEVICE — STAPLER COVIDIEN ENDO GIA STANDARD HANDLE

## (undated) DEVICE — WARMING BLANKET UPPER ADULT

## (undated) DEVICE — SUT SILK 4-0 12-30" TIES

## (undated) DEVICE — SUT SILK 2-0 12-18"

## (undated) DEVICE — DRSG STERISTRIPS 0.5 X 4"

## (undated) DEVICE — POSITIONER FOAM EGG CRATE ULNAR 2PCS (PINK)

## (undated) DEVICE — SUT VICRYL 2-0 27" CT-1 UNDYED

## (undated) DEVICE — ULTRASOUND GEL 0.25L

## (undated) DEVICE — PACK GENERAL LAPAROSCOPY

## (undated) DEVICE — VENODYNE/SCD SLEEVE CALF MEDIUM

## (undated) DEVICE — TIP METZENBAUM SCISSOR MONOPOLAR ENDOCUT (ORANGE)

## (undated) DEVICE — DRAPE IOBAN 23" X 23"

## (undated) DEVICE — TROCAR APPLIED MEDICAL KII BALLOON BLUNT TIP 12MM X 100MM

## (undated) DEVICE — SUT VICRYL 0 27" UR-6

## (undated) DEVICE — DRSG DERMABOND 0.7ML

## (undated) DEVICE — CANISTER SPECIMEN CONVERTOR PLASTIC

## (undated) DEVICE — MARKING PEN W RULER

## (undated) DEVICE — DRAPE TOWEL WHITE 18" X 26"

## (undated) DEVICE — ENDOCATCH 10MM

## (undated) DEVICE — TUBING XTRA AAL ASPIRATION AND ANTICOAGULATION LINE 1/4"

## (undated) DEVICE — TROCAR COVIDIEN VERSAONE FIXATION CANNULA 5MM

## (undated) DEVICE — SUT PDS II 0 18" ENDOLOOP LIGATURE

## (undated) DEVICE — SUT SILK 2-0 18" SH (POP-OFF)

## (undated) DEVICE — SUT PROLENE 5-0 36" RB-1

## (undated) DEVICE — SUT PROLENE 7-0 18" BV

## (undated) DEVICE — DRSG DERMABOND PRINEO 60CM

## (undated) DEVICE — ELCTR GROUNDING PAD ADULT COVIDIEN